# Patient Record
Sex: FEMALE | Race: WHITE | NOT HISPANIC OR LATINO | Employment: OTHER | URBAN - METROPOLITAN AREA
[De-identification: names, ages, dates, MRNs, and addresses within clinical notes are randomized per-mention and may not be internally consistent; named-entity substitution may affect disease eponyms.]

---

## 2020-01-01 ENCOUNTER — APPOINTMENT (EMERGENCY)
Dept: CT IMAGING | Facility: HOSPITAL | Age: 80
End: 2020-01-01
Payer: MEDICARE

## 2020-01-01 ENCOUNTER — PATIENT OUTREACH (OUTPATIENT)
Dept: CASE MANAGEMENT | Facility: OTHER | Age: 80
End: 2020-01-01

## 2020-01-01 ENCOUNTER — HOSPITAL ENCOUNTER (EMERGENCY)
Facility: HOSPITAL | Age: 80
Discharge: HOME | End: 2020-02-12
Attending: EMERGENCY MEDICINE | Admitting: EMERGENCY MEDICINE
Payer: MEDICARE

## 2020-01-01 ENCOUNTER — APPOINTMENT (EMERGENCY)
Dept: RADIOLOGY | Facility: HOSPITAL | Age: 80
DRG: 871 | End: 2020-01-01
Payer: MEDICARE

## 2020-01-01 ENCOUNTER — APPOINTMENT (EMERGENCY)
Dept: RADIOLOGY | Facility: HOSPITAL | Age: 80
End: 2020-01-01
Payer: MEDICARE

## 2020-01-01 ENCOUNTER — HOSPITAL ENCOUNTER (EMERGENCY)
Facility: HOSPITAL | Age: 80
Discharge: NURSING, REHAB OR LONG-TERM CARE | End: 2020-01-19
Attending: EMERGENCY MEDICINE | Admitting: EMERGENCY MEDICINE
Payer: MEDICARE

## 2020-01-01 ENCOUNTER — TELEPHONE (OUTPATIENT)
Dept: OTHER | Facility: OTHER | Age: 80
End: 2020-01-01

## 2020-01-01 ENCOUNTER — TELEPHONE (OUTPATIENT)
Dept: BEHAVIORAL/MENTAL HEALTH CLINIC | Facility: CLINIC | Age: 80
End: 2020-01-01

## 2020-01-01 ENCOUNTER — TELEMEDICINE (OUTPATIENT)
Dept: PULMONOLOGY | Facility: CLINIC | Age: 80
End: 2020-01-01
Payer: MEDICARE

## 2020-01-01 ENCOUNTER — HOSPITAL ENCOUNTER (INPATIENT)
Facility: HOSPITAL | Age: 80
LOS: 6 days | Discharge: HOME | DRG: 871 | End: 2020-05-11
Attending: EMERGENCY MEDICINE | Admitting: INTERNAL MEDICINE
Payer: MEDICARE

## 2020-01-01 ENCOUNTER — HOSPITAL ENCOUNTER (EMERGENCY)
Facility: HOSPITAL | Age: 80
Discharge: HOME | End: 2020-04-27
Attending: EMERGENCY MEDICINE | Admitting: EMERGENCY MEDICINE
Payer: MEDICARE

## 2020-01-01 ENCOUNTER — HOSPITAL ENCOUNTER (EMERGENCY)
Facility: HOSPITAL | Age: 80
Discharge: HOME | End: 2020-01-27
Attending: EMERGENCY MEDICINE
Payer: MEDICARE

## 2020-01-01 ENCOUNTER — TELEPHONE (OUTPATIENT)
Dept: PSYCHIATRY | Facility: CLINIC | Age: 80
End: 2020-01-01

## 2020-01-01 ENCOUNTER — APPOINTMENT (INPATIENT)
Dept: ULTRASOUND IMAGING | Facility: HOSPITAL | Age: 80
DRG: 871 | End: 2020-01-01
Payer: MEDICARE

## 2020-01-01 ENCOUNTER — APPOINTMENT (INPATIENT)
Dept: CT IMAGING | Facility: HOSPITAL | Age: 80
DRG: 871 | End: 2020-01-01
Payer: MEDICARE

## 2020-01-01 ENCOUNTER — TELEPHONE (OUTPATIENT)
Dept: PULMONOLOGY | Facility: CLINIC | Age: 80
End: 2020-01-01

## 2020-01-01 ENCOUNTER — APPOINTMENT (EMERGENCY)
Dept: CT IMAGING | Facility: HOSPITAL | Age: 80
DRG: 871 | End: 2020-01-01
Payer: MEDICARE

## 2020-01-01 ENCOUNTER — PATIENT OUTREACH (OUTPATIENT)
Dept: CASE MANAGEMENT | Facility: HOSPITAL | Age: 80
End: 2020-01-01

## 2020-01-01 ENCOUNTER — EPISODE CHANGES (OUTPATIENT)
Dept: CASE MANAGEMENT | Facility: OTHER | Age: 80
End: 2020-01-01

## 2020-01-01 ENCOUNTER — HOSPITAL ENCOUNTER (EMERGENCY)
Facility: HOSPITAL | Age: 80
Discharge: HOME | End: 2020-02-08
Attending: EMERGENCY MEDICINE | Admitting: EMERGENCY MEDICINE
Payer: MEDICARE

## 2020-01-01 ENCOUNTER — HOSPITAL ENCOUNTER (EMERGENCY)
Facility: HOSPITAL | Age: 80
Discharge: HOME | End: 2020-02-09
Attending: EMERGENCY MEDICINE
Payer: MEDICARE

## 2020-01-01 ENCOUNTER — APPOINTMENT (INPATIENT)
Dept: NON INVASIVE DIAGNOSTICS | Facility: HOSPITAL | Age: 80
DRG: 871 | End: 2020-01-01
Payer: MEDICARE

## 2020-01-01 ENCOUNTER — HOSPITAL ENCOUNTER (EMERGENCY)
Facility: HOSPITAL | Age: 80
End: 2020-07-07
Attending: EMERGENCY MEDICINE | Admitting: EMERGENCY MEDICINE
Payer: MEDICARE

## 2020-01-01 VITALS
HEART RATE: 64 BPM | OXYGEN SATURATION: 94 % | RESPIRATION RATE: 18 BRPM | TEMPERATURE: 98 F | WEIGHT: 190.26 LBS | SYSTOLIC BLOOD PRESSURE: 144 MMHG | DIASTOLIC BLOOD PRESSURE: 67 MMHG

## 2020-01-01 VITALS
DIASTOLIC BLOOD PRESSURE: 65 MMHG | HEART RATE: 64 BPM | HEIGHT: 65 IN | BODY MASS INDEX: 28.51 KG/M2 | TEMPERATURE: 97.7 F | OXYGEN SATURATION: 93 % | SYSTOLIC BLOOD PRESSURE: 131 MMHG | RESPIRATION RATE: 18 BRPM

## 2020-01-01 VITALS
SYSTOLIC BLOOD PRESSURE: 115 MMHG | RESPIRATION RATE: 16 BRPM | WEIGHT: 171.3 LBS | OXYGEN SATURATION: 92 % | HEART RATE: 100 BPM | TEMPERATURE: 98.3 F | DIASTOLIC BLOOD PRESSURE: 70 MMHG

## 2020-01-01 VITALS
TEMPERATURE: 97.8 F | DIASTOLIC BLOOD PRESSURE: 51 MMHG | WEIGHT: 194 LBS | SYSTOLIC BLOOD PRESSURE: 98 MMHG | OXYGEN SATURATION: 98 % | HEART RATE: 63 BPM | RESPIRATION RATE: 16 BRPM

## 2020-01-01 VITALS
HEART RATE: 93 BPM | SYSTOLIC BLOOD PRESSURE: 116 MMHG | TEMPERATURE: 97 F | DIASTOLIC BLOOD PRESSURE: 64 MMHG | OXYGEN SATURATION: 92 %

## 2020-01-01 VITALS
DIASTOLIC BLOOD PRESSURE: 84 MMHG | TEMPERATURE: 97.9 F | OXYGEN SATURATION: 96 % | SYSTOLIC BLOOD PRESSURE: 133 MMHG | RESPIRATION RATE: 18 BRPM | WEIGHT: 193.12 LBS | HEART RATE: 67 BPM

## 2020-01-01 VITALS
DIASTOLIC BLOOD PRESSURE: 35 MMHG | WEIGHT: 138.23 LBS | TEMPERATURE: 98.7 F | SYSTOLIC BLOOD PRESSURE: 67 MMHG | BODY MASS INDEX: 23 KG/M2

## 2020-01-01 VITALS
RESPIRATION RATE: 16 BRPM | SYSTOLIC BLOOD PRESSURE: 167 MMHG | DIASTOLIC BLOOD PRESSURE: 99 MMHG | HEART RATE: 64 BPM | TEMPERATURE: 98 F | OXYGEN SATURATION: 98 %

## 2020-01-01 VITALS
OXYGEN SATURATION: 95 % | DIASTOLIC BLOOD PRESSURE: 67 MMHG | SYSTOLIC BLOOD PRESSURE: 164 MMHG | WEIGHT: 197.53 LBS | HEART RATE: 62 BPM | RESPIRATION RATE: 16 BRPM | TEMPERATURE: 98.8 F
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
## 2020-01-01 DIAGNOSIS — G30.9 ALZHEIMER'S DEMENTIA WITH BEHAVIORAL DISTURBANCE, UNSPECIFIED TIMING OF DEMENTIA ONSET (HCC): Primary | ICD-10-CM
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
## 2020-01-01 DIAGNOSIS — I48.91 ATRIAL FIBRILLATION (HCC): ICD-10-CM

## 2020-01-01 DIAGNOSIS — R57.8 NONTRAUMATIC HEMORRHAGIC SHOCK (HCC): Primary | ICD-10-CM
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
## 2020-01-01 DIAGNOSIS — R10.84 ACUTE GENERALIZED ABDOMINAL PAIN: ICD-10-CM

## 2020-01-01 DIAGNOSIS — R41.82 ALTERED MENTAL STATUS, UNSPECIFIED ALTERED MENTAL STATUS TYPE: Primary | ICD-10-CM

## 2020-01-01 DIAGNOSIS — D72.829 LEUKOCYTOSIS, UNSPECIFIED TYPE: ICD-10-CM

## 2020-01-01 DIAGNOSIS — R09.02 HYPOXIA: ICD-10-CM
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
## 2020-01-01 DIAGNOSIS — M25.512 LEFT SHOULDER PAIN: Primary | ICD-10-CM

## 2020-01-01 DIAGNOSIS — S00.83XA FOREHEAD CONTUSION: Primary | ICD-10-CM

## 2020-01-01 DIAGNOSIS — I82.401 ACUTE DEEP VEIN THROMBOSIS (DVT) OF RIGHT LOWER EXTREMITY, UNSPECIFIED VEIN (HCC): ICD-10-CM

## 2020-01-01 DIAGNOSIS — E87.2 HIGH ANION GAP METABOLIC ACIDOSIS: ICD-10-CM

## 2020-01-01 DIAGNOSIS — J96.01 ACUTE RESPIRATORY FAILURE WITH HYPOXIA (HCC): ICD-10-CM

## 2020-01-01 DIAGNOSIS — M25.511 ACUTE PAIN OF RIGHT SHOULDER: ICD-10-CM

## 2020-01-01 DIAGNOSIS — E03.9 HYPOTHYROID: ICD-10-CM

## 2020-01-01 DIAGNOSIS — K92.2 GASTROINTESTINAL HEMORRHAGE, UNSPECIFIED GASTROINTESTINAL HEMORRHAGE TYPE: ICD-10-CM

## 2020-01-01 DIAGNOSIS — I46.9 CARDIOPULMONARY ARREST (HCC): ICD-10-CM

## 2020-01-01 DIAGNOSIS — N39.0 URINARY TRACT INFECTION: ICD-10-CM

## 2020-01-01 DIAGNOSIS — E87.0 ACUTE HYPERNATREMIA: ICD-10-CM

## 2020-01-01 DIAGNOSIS — F02.81 ALZHEIMER'S DEMENTIA WITH BEHAVIORAL DISTURBANCE, UNSPECIFIED TIMING OF DEMENTIA ONSET (HCC): Primary | ICD-10-CM

## 2020-01-01 DIAGNOSIS — N17.9 ACUTE RENAL FAILURE (ARF) (HCC): ICD-10-CM

## 2020-01-01 DIAGNOSIS — Z51.5 ENCOUNTER FOR HOSPICE CARE: ICD-10-CM

## 2020-01-01 DIAGNOSIS — I82.409 DVT (DEEP VENOUS THROMBOSIS) (HCC): ICD-10-CM

## 2020-01-01 DIAGNOSIS — S00.83XA CONTUSION OF FACE, INITIAL ENCOUNTER: ICD-10-CM

## 2020-01-01 DIAGNOSIS — R41.82 ALTERED MENTAL STATUS: ICD-10-CM

## 2020-01-01 DIAGNOSIS — A41.9 SEPSIS (HCC): ICD-10-CM

## 2020-01-01 DIAGNOSIS — E87.6 ACUTE HYPOKALEMIA: ICD-10-CM

## 2020-01-01 DIAGNOSIS — I95.9 HYPOTENSION, UNSPECIFIED HYPOTENSION TYPE: ICD-10-CM

## 2020-01-01 DIAGNOSIS — K55.9 MESENTERIC ISCHEMIA (HCC): ICD-10-CM

## 2020-01-01 DIAGNOSIS — M25.512 LEFT SHOULDER PAIN: ICD-10-CM

## 2020-01-01 LAB
ALBUMIN SERPL BCP-MCNC: 2.6 G/DL (ref 3.5–5)
ALBUMIN SERPL BCP-MCNC: 2.7 G/DL (ref 3.5–5)
ALBUMIN SERPL BCP-MCNC: 3.2 G/DL (ref 3.5–5)
ALBUMIN SERPL BCP-MCNC: 3.5 G/DL (ref 3.5–5)
ALP SERPL-CCNC: 111 U/L (ref 46–116)
ALP SERPL-CCNC: 78 U/L (ref 46–116)
ALP SERPL-CCNC: 87 U/L (ref 46–116)
ALP SERPL-CCNC: 88 U/L (ref 46–116)
ALT SERPL W P-5'-P-CCNC: 19 U/L (ref 12–78)
ALT SERPL W P-5'-P-CCNC: 27 U/L (ref 12–78)
ALT SERPL W P-5'-P-CCNC: 29 U/L (ref 12–78)
ALT SERPL W P-5'-P-CCNC: 32 U/L (ref 12–78)
ANION GAP SERPL CALCULATED.3IONS-SCNC: 11 MMOL/L (ref 4–13)
ANION GAP SERPL CALCULATED.3IONS-SCNC: 14 MMOL/L (ref 4–13)
ANION GAP SERPL CALCULATED.3IONS-SCNC: 15 MMOL/L (ref 4–13)
ANION GAP SERPL CALCULATED.3IONS-SCNC: 31 MMOL/L (ref 4–13)
ANION GAP SERPL CALCULATED.3IONS-SCNC: 6 MMOL/L (ref 4–13)
ANION GAP SERPL CALCULATED.3IONS-SCNC: 7 MMOL/L (ref 4–13)
ANION GAP SERPL CALCULATED.3IONS-SCNC: 8 MMOL/L (ref 4–13)
ANION GAP SERPL CALCULATED.3IONS-SCNC: 8 MMOL/L (ref 4–13)
ANION GAP SERPL CALCULATED.3IONS-SCNC: 9 MMOL/L (ref 4–13)
ANION GAP SERPL CALCULATED.3IONS-SCNC: 9 MMOL/L (ref 4–13)
APTT PPP: 30 SECONDS (ref 23–37)
AST SERPL W P-5'-P-CCNC: 18 U/L (ref 5–45)
AST SERPL W P-5'-P-CCNC: 27 U/L (ref 5–45)
AST SERPL W P-5'-P-CCNC: 31 U/L (ref 5–45)
AST SERPL W P-5'-P-CCNC: 46 U/L (ref 5–45)
ATRIAL RATE: 105 BPM
ATRIAL RATE: 126 BPM
ATRIAL RATE: 202 BPM
BACTERIA BLD CULT: ABNORMAL
BACTERIA BLD CULT: NORMAL
BACTERIA UR QL AUTO: ABNORMAL /HPF
BACTERIA UR QL AUTO: ABNORMAL /HPF
BASOPHILS # BLD AUTO: 0.01 THOUSANDS/ΜL (ref 0–0.1)
BASOPHILS # BLD AUTO: 0.03 THOUSANDS/ΜL (ref 0–0.1)
BASOPHILS # BLD AUTO: 0.04 THOUSANDS/ΜL (ref 0–0.1)
BASOPHILS # BLD AUTO: 0.05 THOUSANDS/ΜL (ref 0–0.1)
BASOPHILS NFR BLD AUTO: 0 % (ref 0–1)
BILIRUB SERPL-MCNC: 0.7 MG/DL (ref 0.2–1)
BILIRUB SERPL-MCNC: 0.93 MG/DL (ref 0.2–1)
BILIRUB SERPL-MCNC: 1.1 MG/DL (ref 0.2–1)
BILIRUB SERPL-MCNC: 1.94 MG/DL (ref 0.2–1)
BILIRUB UR QL STRIP: ABNORMAL
BILIRUB UR QL STRIP: NEGATIVE
BUN SERPL-MCNC: 12 MG/DL (ref 5–25)
BUN SERPL-MCNC: 13 MG/DL (ref 5–25)
BUN SERPL-MCNC: 14 MG/DL (ref 5–25)
BUN SERPL-MCNC: 14 MG/DL (ref 5–25)
BUN SERPL-MCNC: 15 MG/DL (ref 5–25)
BUN SERPL-MCNC: 15 MG/DL (ref 5–25)
BUN SERPL-MCNC: 17 MG/DL (ref 5–25)
BUN SERPL-MCNC: 17 MG/DL (ref 5–25)
BUN SERPL-MCNC: 21 MG/DL (ref 5–25)
BUN SERPL-MCNC: 34 MG/DL (ref 5–25)
CA-I BLD-SCNC: 1.15 MMOL/L (ref 1.12–1.32)
CA-I BLD-SCNC: 1.23 MMOL/L (ref 1.12–1.32)
CALCIUM SERPL-MCNC: 8.3 MG/DL (ref 8.3–10.1)
CALCIUM SERPL-MCNC: 8.6 MG/DL (ref 8.3–10.1)
CALCIUM SERPL-MCNC: 8.7 MG/DL (ref 8.3–10.1)
CALCIUM SERPL-MCNC: 8.8 MG/DL (ref 8.3–10.1)
CALCIUM SERPL-MCNC: 8.9 MG/DL (ref 8.3–10.1)
CALCIUM SERPL-MCNC: 9.1 MG/DL (ref 8.3–10.1)
CALCIUM SERPL-MCNC: 9.4 MG/DL (ref 8.3–10.1)
CALCIUM SERPL-MCNC: 9.4 MG/DL (ref 8.3–10.1)
CHLORIDE SERPL-SCNC: 103 MMOL/L (ref 100–108)
CHLORIDE SERPL-SCNC: 104 MMOL/L (ref 100–108)
CHLORIDE SERPL-SCNC: 104 MMOL/L (ref 100–108)
CHLORIDE SERPL-SCNC: 105 MMOL/L (ref 100–108)
CHLORIDE SERPL-SCNC: 105 MMOL/L (ref 100–108)
CHLORIDE SERPL-SCNC: 106 MMOL/L (ref 100–108)
CHLORIDE SERPL-SCNC: 107 MMOL/L (ref 100–108)
CHLORIDE SERPL-SCNC: 108 MMOL/L (ref 100–108)
CHLORIDE SERPL-SCNC: 110 MMOL/L (ref 100–108)
CHLORIDE SERPL-SCNC: 115 MMOL/L (ref 100–108)
CLARITY UR: ABNORMAL
CLARITY UR: CLEAR
CO2 SERPL-SCNC: 16 MMOL/L (ref 21–32)
CO2 SERPL-SCNC: 26 MMOL/L (ref 21–32)
CO2 SERPL-SCNC: 26 MMOL/L (ref 21–32)
CO2 SERPL-SCNC: 27 MMOL/L (ref 21–32)
CO2 SERPL-SCNC: 27 MMOL/L (ref 21–32)
CO2 SERPL-SCNC: 28 MMOL/L (ref 21–32)
CO2 SERPL-SCNC: 29 MMOL/L (ref 21–32)
CO2 SERPL-SCNC: 32 MMOL/L (ref 21–32)
COLOR UR: ABNORMAL
COLOR UR: YELLOW
CREAT SERPL-MCNC: 0.8 MG/DL (ref 0.6–1.3)
CREAT SERPL-MCNC: 0.84 MG/DL (ref 0.6–1.3)
CREAT SERPL-MCNC: 0.85 MG/DL (ref 0.6–1.3)
CREAT SERPL-MCNC: 0.91 MG/DL (ref 0.6–1.3)
CREAT SERPL-MCNC: 0.93 MG/DL (ref 0.6–1.3)
CREAT SERPL-MCNC: 0.95 MG/DL (ref 0.6–1.3)
CREAT SERPL-MCNC: 1.03 MG/DL (ref 0.6–1.3)
CREAT SERPL-MCNC: 1.05 MG/DL (ref 0.6–1.3)
CREAT SERPL-MCNC: 1.37 MG/DL (ref 0.6–1.3)
CREAT SERPL-MCNC: 2.74 MG/DL (ref 0.6–1.3)
CRP SERPL HS-MCNC: >90 MG/L
CRP SERPL QL: 181.3 MG/L
CRP SERPL QL: 233.7 MG/L
D DIMER PPP FEU-MCNC: >10 UG/ML FEU
D DIMER PPP FEU-MCNC: >10 UG/ML FEU
EOSINOPHIL # BLD AUTO: 0.01 THOUSAND/ΜL (ref 0–0.61)
EOSINOPHIL # BLD AUTO: 0.01 THOUSAND/ΜL (ref 0–0.61)
EOSINOPHIL # BLD AUTO: 0.02 THOUSAND/ΜL (ref 0–0.61)
EOSINOPHIL # BLD AUTO: 0.02 THOUSAND/ΜL (ref 0–0.61)
EOSINOPHIL # BLD AUTO: 0.04 THOUSAND/ΜL (ref 0–0.61)
EOSINOPHIL # BLD AUTO: 0.06 THOUSAND/ΜL (ref 0–0.61)
EOSINOPHIL NFR BLD AUTO: 0 % (ref 0–6)
ERYTHROCYTE [DISTWIDTH] IN BLOOD BY AUTOMATED COUNT: 14.4 % (ref 11.6–15.1)
ERYTHROCYTE [DISTWIDTH] IN BLOOD BY AUTOMATED COUNT: 14.4 % (ref 11.6–15.1)
ERYTHROCYTE [DISTWIDTH] IN BLOOD BY AUTOMATED COUNT: 14.5 % (ref 11.6–15.1)
ERYTHROCYTE [DISTWIDTH] IN BLOOD BY AUTOMATED COUNT: 14.5 % (ref 11.6–15.1)
ERYTHROCYTE [DISTWIDTH] IN BLOOD BY AUTOMATED COUNT: 14.6 % (ref 11.6–15.1)
ERYTHROCYTE [DISTWIDTH] IN BLOOD BY AUTOMATED COUNT: 14.6 % (ref 11.6–15.1)
ERYTHROCYTE [DISTWIDTH] IN BLOOD BY AUTOMATED COUNT: 14.7 % (ref 11.6–15.1)
ERYTHROCYTE [DISTWIDTH] IN BLOOD BY AUTOMATED COUNT: 17.7 % (ref 11.6–15.1)
ERYTHROCYTE [SEDIMENTATION RATE] IN BLOOD: 52 MM/HOUR (ref 0–20)
FERRITIN SERPL-MCNC: 526 NG/ML (ref 8–388)
FERRITIN SERPL-MCNC: 527 NG/ML (ref 8–388)
FERRITIN SERPL-MCNC: 538 NG/ML (ref 8–388)
FIBRINOGEN PPP-MCNC: 629 MG/DL (ref 227–495)
GFR SERPL CREATININE-BSD FRML MDRD: 16 ML/MIN/1.73SQ M
GFR SERPL CREATININE-BSD FRML MDRD: 37 ML/MIN/1.73SQ M
GFR SERPL CREATININE-BSD FRML MDRD: 51 ML/MIN/1.73SQ M
GFR SERPL CREATININE-BSD FRML MDRD: 52 ML/MIN/1.73SQ M
GFR SERPL CREATININE-BSD FRML MDRD: 57 ML/MIN/1.73SQ M
GFR SERPL CREATININE-BSD FRML MDRD: 59 ML/MIN/1.73SQ M
GFR SERPL CREATININE-BSD FRML MDRD: 60 ML/MIN/1.73SQ M
GFR SERPL CREATININE-BSD FRML MDRD: 65 ML/MIN/1.73SQ M
GFR SERPL CREATININE-BSD FRML MDRD: 66 ML/MIN/1.73SQ M
GFR SERPL CREATININE-BSD FRML MDRD: 70 ML/MIN/1.73SQ M
GLUCOSE SERPL-MCNC: 108 MG/DL (ref 65–140)
GLUCOSE SERPL-MCNC: 110 MG/DL (ref 65–140)
GLUCOSE SERPL-MCNC: 122 MG/DL (ref 65–140)
GLUCOSE SERPL-MCNC: 164 MG/DL (ref 65–140)
GLUCOSE SERPL-MCNC: 174 MG/DL (ref 65–140)
GLUCOSE SERPL-MCNC: 185 MG/DL (ref 65–140)
GLUCOSE SERPL-MCNC: 80 MG/DL (ref 65–140)
GLUCOSE SERPL-MCNC: 81 MG/DL (ref 65–140)
GLUCOSE SERPL-MCNC: 83 MG/DL (ref 65–140)
GLUCOSE SERPL-MCNC: 95 MG/DL (ref 65–140)
GLUCOSE UR STRIP-MCNC: NEGATIVE MG/DL
GLUCOSE UR STRIP-MCNC: NEGATIVE MG/DL
GRAM STN SPEC: ABNORMAL
HCT VFR BLD AUTO: 35.5 % (ref 34.8–46.1)
HCT VFR BLD AUTO: 36.4 % (ref 34.8–46.1)
HCT VFR BLD AUTO: 36.4 % (ref 34.8–46.1)
HCT VFR BLD AUTO: 39.4 % (ref 34.8–46.1)
HCT VFR BLD AUTO: 40.2 % (ref 34.8–46.1)
HCT VFR BLD AUTO: 40.2 % (ref 34.8–46.1)
HCT VFR BLD AUTO: 42.7 % (ref 34.8–46.1)
HCT VFR BLD AUTO: 43.6 % (ref 34.8–46.1)
HGB BLD-MCNC: 11.7 G/DL (ref 11.5–15.4)
HGB BLD-MCNC: 11.8 G/DL (ref 11.5–15.4)
HGB BLD-MCNC: 11.8 G/DL (ref 11.5–15.4)
HGB BLD-MCNC: 13.1 G/DL (ref 11.5–15.4)
HGB BLD-MCNC: 13.4 G/DL (ref 11.5–15.4)
HGB BLD-MCNC: 13.7 G/DL (ref 11.5–15.4)
HGB BLD-MCNC: 14.2 G/DL (ref 11.5–15.4)
HGB BLD-MCNC: 14.3 G/DL (ref 11.5–15.4)
HGB UR QL STRIP.AUTO: ABNORMAL
HGB UR QL STRIP.AUTO: ABNORMAL
HYALINE CASTS #/AREA URNS LPF: ABNORMAL /LPF
IMM GRANULOCYTES # BLD AUTO: 0.04 THOUSAND/UL (ref 0–0.2)
IMM GRANULOCYTES # BLD AUTO: 0.05 THOUSAND/UL (ref 0–0.2)
IMM GRANULOCYTES # BLD AUTO: 0.05 THOUSAND/UL (ref 0–0.2)
IMM GRANULOCYTES # BLD AUTO: 0.07 THOUSAND/UL (ref 0–0.2)
IMM GRANULOCYTES # BLD AUTO: 0.07 THOUSAND/UL (ref 0–0.2)
IMM GRANULOCYTES # BLD AUTO: 0.22 THOUSAND/UL (ref 0–0.2)
IMM GRANULOCYTES NFR BLD AUTO: 0 % (ref 0–2)
IMM GRANULOCYTES NFR BLD AUTO: 1 % (ref 0–2)
INR PPP: 1.18 (ref 0.84–1.19)
INR PPP: 1.25 (ref 0.84–1.19)
KETONES UR STRIP-MCNC: ABNORMAL MG/DL
KETONES UR STRIP-MCNC: NEGATIVE MG/DL
LACTATE SERPL-SCNC: 1.6 MMOL/L (ref 0.5–2)
LACTATE SERPL-SCNC: 3.4 MMOL/L (ref 0.5–2)
LEUKOCYTE ESTERASE UR QL STRIP: NEGATIVE
LEUKOCYTE ESTERASE UR QL STRIP: NEGATIVE
LYMPHOCYTES # BLD AUTO: 0.88 THOUSANDS/ΜL (ref 0.6–4.47)
LYMPHOCYTES # BLD AUTO: 0.95 THOUSANDS/ΜL (ref 0.6–4.47)
LYMPHOCYTES # BLD AUTO: 1.22 THOUSANDS/ΜL (ref 0.6–4.47)
LYMPHOCYTES # BLD AUTO: 1.26 THOUSANDS/ΜL (ref 0.6–4.47)
LYMPHOCYTES # BLD AUTO: 1.28 THOUSANDS/ΜL (ref 0.6–4.47)
LYMPHOCYTES # BLD AUTO: 1.68 THOUSANDS/ΜL (ref 0.6–4.47)
LYMPHOCYTES NFR BLD AUTO: 11 % (ref 14–44)
LYMPHOCYTES NFR BLD AUTO: 11 % (ref 14–44)
LYMPHOCYTES NFR BLD AUTO: 16 % (ref 14–44)
LYMPHOCYTES NFR BLD AUTO: 5 % (ref 14–44)
LYMPHOCYTES NFR BLD AUTO: 7 % (ref 14–44)
LYMPHOCYTES NFR BLD AUTO: 7 % (ref 14–44)
MAGNESIUM SERPL-MCNC: 2 MG/DL (ref 1.6–2.6)
MAGNESIUM SERPL-MCNC: 2.1 MG/DL (ref 1.6–2.6)
MAGNESIUM SERPL-MCNC: 2.2 MG/DL (ref 1.6–2.6)
MCH RBC QN AUTO: 30.6 PG (ref 26.8–34.3)
MCH RBC QN AUTO: 30.6 PG (ref 26.8–34.3)
MCH RBC QN AUTO: 30.7 PG (ref 26.8–34.3)
MCH RBC QN AUTO: 30.8 PG (ref 26.8–34.3)
MCH RBC QN AUTO: 31.2 PG (ref 26.8–34.3)
MCH RBC QN AUTO: 31.4 PG (ref 26.8–34.3)
MCH RBC QN AUTO: 31.6 PG (ref 26.8–34.3)
MCH RBC QN AUTO: 31.9 PG (ref 26.8–34.3)
MCHC RBC AUTO-ENTMCNC: 32.1 G/DL (ref 31.4–37.4)
MCHC RBC AUTO-ENTMCNC: 32.4 G/DL (ref 31.4–37.4)
MCHC RBC AUTO-ENTMCNC: 32.6 G/DL (ref 31.4–37.4)
MCHC RBC AUTO-ENTMCNC: 33.2 G/DL (ref 31.4–37.4)
MCHC RBC AUTO-ENTMCNC: 33.2 G/DL (ref 31.4–37.4)
MCHC RBC AUTO-ENTMCNC: 33.3 G/DL (ref 31.4–37.4)
MCHC RBC AUTO-ENTMCNC: 33.5 G/DL (ref 31.4–37.4)
MCHC RBC AUTO-ENTMCNC: 34.1 G/DL (ref 31.4–37.4)
MCV RBC AUTO: 92 FL (ref 82–98)
MCV RBC AUTO: 93 FL (ref 82–98)
MCV RBC AUTO: 93 FL (ref 82–98)
MCV RBC AUTO: 94 FL (ref 82–98)
MCV RBC AUTO: 94 FL (ref 82–98)
MCV RBC AUTO: 95 FL (ref 82–98)
MCV RBC AUTO: 96 FL (ref 82–98)
MCV RBC AUTO: 98 FL (ref 82–98)
MONOCYTES # BLD AUTO: 0.52 THOUSAND/ΜL (ref 0.17–1.22)
MONOCYTES # BLD AUTO: 0.62 THOUSAND/ΜL (ref 0.17–1.22)
MONOCYTES # BLD AUTO: 0.64 THOUSAND/ΜL (ref 0.17–1.22)
MONOCYTES # BLD AUTO: 0.66 THOUSAND/ΜL (ref 0.17–1.22)
MONOCYTES # BLD AUTO: 0.67 THOUSAND/ΜL (ref 0.17–1.22)
MONOCYTES # BLD AUTO: 0.8 THOUSAND/ΜL (ref 0.17–1.22)
MONOCYTES NFR BLD AUTO: 3 % (ref 4–12)
MONOCYTES NFR BLD AUTO: 4 % (ref 4–12)
MONOCYTES NFR BLD AUTO: 5 % (ref 4–12)
MONOCYTES NFR BLD AUTO: 5 % (ref 4–12)
MONOCYTES NFR BLD AUTO: 6 % (ref 4–12)
MONOCYTES NFR BLD AUTO: 6 % (ref 4–12)
MRSA NOSE QL CULT: NORMAL
MUCOUS THREADS UR QL AUTO: ABNORMAL
MUCOUS THREADS UR QL AUTO: ABNORMAL
NEUTROPHILS # BLD AUTO: 10.11 THOUSANDS/ΜL (ref 1.85–7.62)
NEUTROPHILS # BLD AUTO: 11.27 THOUSANDS/ΜL (ref 1.85–7.62)
NEUTROPHILS # BLD AUTO: 12.01 THOUSANDS/ΜL (ref 1.85–7.62)
NEUTROPHILS # BLD AUTO: 21.54 THOUSANDS/ΜL (ref 1.85–7.62)
NEUTROPHILS # BLD AUTO: 8.05 THOUSANDS/ΜL (ref 1.85–7.62)
NEUTROPHILS # BLD AUTO: 9.36 THOUSANDS/ΜL (ref 1.85–7.62)
NEUTS SEG NFR BLD AUTO: 78 % (ref 43–75)
NEUTS SEG NFR BLD AUTO: 83 % (ref 43–75)
NEUTS SEG NFR BLD AUTO: 85 % (ref 43–75)
NEUTS SEG NFR BLD AUTO: 87 % (ref 43–75)
NEUTS SEG NFR BLD AUTO: 87 % (ref 43–75)
NEUTS SEG NFR BLD AUTO: 91 % (ref 43–75)
NITRITE UR QL STRIP: NEGATIVE
NITRITE UR QL STRIP: NEGATIVE
NON-SQ EPI CELLS URNS QL MICRO: ABNORMAL /HPF
NON-SQ EPI CELLS URNS QL MICRO: ABNORMAL /HPF
NRBC BLD AUTO-RTO: 0 /100 WBCS
NT-PROBNP SERPL-MCNC: 499 PG/ML
P AXIS: 55 DEGREES
P AXIS: 66 DEGREES
PH UR STRIP.AUTO: 6 [PH] (ref 4.5–8)
PH UR STRIP.AUTO: 6.5 [PH]
PHOSPHATE SERPL-MCNC: 2 MG/DL (ref 2.3–4.1)
PHOSPHATE SERPL-MCNC: 2.9 MG/DL (ref 2.3–4.1)
PLATELET # BLD AUTO: 234 THOUSANDS/UL (ref 149–390)
PLATELET # BLD AUTO: 257 THOUSANDS/UL (ref 149–390)
PLATELET # BLD AUTO: 280 THOUSANDS/UL (ref 149–390)
PLATELET # BLD AUTO: 297 THOUSANDS/UL (ref 149–390)
PLATELET # BLD AUTO: 308 THOUSANDS/UL (ref 149–390)
PLATELET # BLD AUTO: 339 THOUSANDS/UL (ref 149–390)
PLATELET # BLD AUTO: 385 THOUSANDS/UL (ref 149–390)
PLATELET # BLD AUTO: 491 THOUSANDS/UL (ref 149–390)
PMV BLD AUTO: 10.2 FL (ref 8.9–12.7)
PMV BLD AUTO: 11.2 FL (ref 8.9–12.7)
PMV BLD AUTO: 9.6 FL (ref 8.9–12.7)
PMV BLD AUTO: 9.6 FL (ref 8.9–12.7)
PMV BLD AUTO: 9.7 FL (ref 8.9–12.7)
PMV BLD AUTO: 9.8 FL (ref 8.9–12.7)
POTASSIUM SERPL-SCNC: 2.5 MMOL/L (ref 3.5–5.3)
POTASSIUM SERPL-SCNC: 2.7 MMOL/L (ref 3.5–5.3)
POTASSIUM SERPL-SCNC: 3.3 MMOL/L (ref 3.5–5.3)
POTASSIUM SERPL-SCNC: 3.4 MMOL/L (ref 3.5–5.3)
POTASSIUM SERPL-SCNC: 3.4 MMOL/L (ref 3.5–5.3)
POTASSIUM SERPL-SCNC: 3.5 MMOL/L (ref 3.5–5.3)
POTASSIUM SERPL-SCNC: 3.6 MMOL/L (ref 3.5–5.3)
POTASSIUM SERPL-SCNC: 3.8 MMOL/L (ref 3.5–5.3)
POTASSIUM SERPL-SCNC: 3.8 MMOL/L (ref 3.5–5.3)
POTASSIUM SERPL-SCNC: 4 MMOL/L (ref 3.5–5.3)
PR INTERVAL: 184 MS
PR INTERVAL: 192 MS
PROCALCITONIN SERPL-MCNC: 0.11 NG/ML
PROCALCITONIN SERPL-MCNC: <0.05 NG/ML
PROCALCITONIN SERPL-MCNC: <0.05 NG/ML
PROT SERPL-MCNC: 6.5 G/DL (ref 6.4–8.2)
PROT SERPL-MCNC: 6.7 G/DL (ref 6.4–8.2)
PROT SERPL-MCNC: 7.1 G/DL (ref 6.4–8.2)
PROT SERPL-MCNC: 7.5 G/DL (ref 6.4–8.2)
PROT UR STRIP-MCNC: ABNORMAL MG/DL
PROT UR STRIP-MCNC: NEGATIVE MG/DL
PROTHROMBIN TIME: 14.4 SECONDS (ref 11.6–14.5)
PROTHROMBIN TIME: 15.1 SECONDS (ref 11.6–14.5)
QRS AXIS: 46 DEGREES
QRS AXIS: 67 DEGREES
QRS AXIS: 68 DEGREES
QRSD INTERVAL: 76 MS
QRSD INTERVAL: 78 MS
QRSD INTERVAL: 88 MS
QT INTERVAL: 294 MS
QT INTERVAL: 296 MS
QT INTERVAL: 304 MS
QTC INTERVAL: 401 MS
QTC INTERVAL: 428 MS
QTC INTERVAL: 465 MS
RBC # BLD AUTO: 3.81 MILLION/UL (ref 3.81–5.12)
RBC # BLD AUTO: 3.83 MILLION/UL (ref 3.81–5.12)
RBC # BLD AUTO: 3.85 MILLION/UL (ref 3.81–5.12)
RBC # BLD AUTO: 4.2 MILLION/UL (ref 3.81–5.12)
RBC # BLD AUTO: 4.33 MILLION/UL (ref 3.81–5.12)
RBC # BLD AUTO: 4.38 MILLION/UL (ref 3.81–5.12)
RBC # BLD AUTO: 4.45 MILLION/UL (ref 3.81–5.12)
RBC # BLD AUTO: 4.55 MILLION/UL (ref 3.81–5.12)
RBC #/AREA URNS AUTO: ABNORMAL /HPF
RBC #/AREA URNS AUTO: ABNORMAL /HPF
SARS-COV-2 RNA RESP QL NAA+PROBE: NEGATIVE
SODIUM SERPL-SCNC: 140 MMOL/L (ref 136–145)
SODIUM SERPL-SCNC: 142 MMOL/L (ref 136–145)
SODIUM SERPL-SCNC: 144 MMOL/L (ref 136–145)
SODIUM SERPL-SCNC: 145 MMOL/L (ref 136–145)
SODIUM SERPL-SCNC: 162 MMOL/L (ref 136–145)
SP GR UR STRIP.AUTO: <=1.005 (ref 1–1.03)
SP GR UR STRIP.AUTO: >=1.03 (ref 1–1.03)
T WAVE AXIS: -1 DEGREES
T WAVE AXIS: -19 DEGREES
T WAVE AXIS: -56 DEGREES
TROPONIN I SERPL-MCNC: 0.03 NG/ML
TSH SERPL DL<=0.05 MIU/L-ACNC: 3.37 UIU/ML (ref 0.36–3.74)
UROBILINOGEN UR QL STRIP.AUTO: 1 E.U./DL
UROBILINOGEN UR QL STRIP.AUTO: 2 E.U./DL
VENTRICULAR RATE: 105 BPM
VENTRICULAR RATE: 126 BPM
VENTRICULAR RATE: 151 BPM
WBC # BLD AUTO: 10.1 THOUSAND/UL (ref 4.31–10.16)
WBC # BLD AUTO: 10.51 THOUSAND/UL (ref 4.31–10.16)
WBC # BLD AUTO: 11.38 THOUSAND/UL (ref 4.31–10.16)
WBC # BLD AUTO: 11.96 THOUSAND/UL (ref 4.31–10.16)
WBC # BLD AUTO: 12.93 THOUSAND/UL (ref 4.31–10.16)
WBC # BLD AUTO: 13.76 THOUSAND/UL (ref 4.31–10.16)
WBC # BLD AUTO: 23.83 THOUSAND/UL (ref 4.31–10.16)
WBC # BLD AUTO: 9.42 THOUSAND/UL (ref 4.31–10.16)
WBC #/AREA URNS AUTO: ABNORMAL /HPF
WBC #/AREA URNS AUTO: ABNORMAL /HPF

## 2020-01-01 PROCEDURE — 70450 CT HEAD/BRAIN W/O DYE: CPT

## 2020-01-01 PROCEDURE — 99232 SBSQ HOSP IP/OBS MODERATE 35: CPT | Performed by: INTERNAL MEDICINE

## 2020-01-01 PROCEDURE — 99223 1ST HOSP IP/OBS HIGH 75: CPT | Performed by: INTERNAL MEDICINE

## 2020-01-01 PROCEDURE — 83735 ASSAY OF MAGNESIUM: CPT | Performed by: INTERNAL MEDICINE

## 2020-01-01 PROCEDURE — 92526 ORAL FUNCTION THERAPY: CPT

## 2020-01-01 PROCEDURE — 80048 BASIC METABOLIC PNL TOTAL CA: CPT | Performed by: INTERNAL MEDICINE

## 2020-01-01 PROCEDURE — 80053 COMPREHEN METABOLIC PANEL: CPT | Performed by: INTERNAL MEDICINE

## 2020-01-01 PROCEDURE — 93010 ELECTROCARDIOGRAM REPORT: CPT | Performed by: INTERNAL MEDICINE

## 2020-01-01 PROCEDURE — 83880 ASSAY OF NATRIURETIC PEPTIDE: CPT | Performed by: PHYSICIAN ASSISTANT

## 2020-01-01 PROCEDURE — 99285 EMERGENCY DEPT VISIT HI MDM: CPT

## 2020-01-01 PROCEDURE — 82330 ASSAY OF CALCIUM: CPT | Performed by: INTERNAL MEDICINE

## 2020-01-01 PROCEDURE — 93306 TTE W/DOPPLER COMPLETE: CPT | Performed by: INTERNAL MEDICINE

## 2020-01-01 PROCEDURE — 99232 SBSQ HOSP IP/OBS MODERATE 35: CPT | Performed by: PHYSICIAN ASSISTANT

## 2020-01-01 PROCEDURE — 99284 EMERGENCY DEPT VISIT MOD MDM: CPT

## 2020-01-01 PROCEDURE — 99214 OFFICE O/P EST MOD 30 MIN: CPT | Performed by: INTERNAL MEDICINE

## 2020-01-01 PROCEDURE — 92610 EVALUATE SWALLOWING FUNCTION: CPT

## 2020-01-01 PROCEDURE — 99283 EMERGENCY DEPT VISIT LOW MDM: CPT | Performed by: EMERGENCY MEDICINE

## 2020-01-01 PROCEDURE — 71045 X-RAY EXAM CHEST 1 VIEW: CPT

## 2020-01-01 PROCEDURE — 74176 CT ABD & PELVIS W/O CONTRAST: CPT

## 2020-01-01 PROCEDURE — 99284 EMERGENCY DEPT VISIT MOD MDM: CPT | Performed by: EMERGENCY MEDICINE

## 2020-01-01 PROCEDURE — 36415 COLL VENOUS BLD VENIPUNCTURE: CPT | Performed by: PHYSICIAN ASSISTANT

## 2020-01-01 PROCEDURE — 84145 PROCALCITONIN (PCT): CPT | Performed by: INTERNAL MEDICINE

## 2020-01-01 PROCEDURE — 96376 TX/PRO/DX INJ SAME DRUG ADON: CPT

## 2020-01-01 PROCEDURE — 80048 BASIC METABOLIC PNL TOTAL CA: CPT | Performed by: EMERGENCY MEDICINE

## 2020-01-01 PROCEDURE — 86140 C-REACTIVE PROTEIN: CPT | Performed by: INTERNAL MEDICINE

## 2020-01-01 PROCEDURE — 94762 N-INVAS EAR/PLS OXIMTRY CONT: CPT

## 2020-01-01 PROCEDURE — 87635 SARS-COV-2 COVID-19 AMP PRB: CPT | Performed by: PHYSICIAN ASSISTANT

## 2020-01-01 PROCEDURE — 94760 N-INVAS EAR/PLS OXIMETRY 1: CPT

## 2020-01-01 PROCEDURE — 93005 ELECTROCARDIOGRAM TRACING: CPT

## 2020-01-01 PROCEDURE — 85025 COMPLETE CBC W/AUTO DIFF WBC: CPT | Performed by: INTERNAL MEDICINE

## 2020-01-01 PROCEDURE — 99285 EMERGENCY DEPT VISIT HI MDM: CPT | Performed by: PHYSICIAN ASSISTANT

## 2020-01-01 PROCEDURE — 84443 ASSAY THYROID STIM HORMONE: CPT | Performed by: INTERNAL MEDICINE

## 2020-01-01 PROCEDURE — 93970 EXTREMITY STUDY: CPT

## 2020-01-01 PROCEDURE — 83605 ASSAY OF LACTIC ACID: CPT | Performed by: PHYSICIAN ASSISTANT

## 2020-01-01 PROCEDURE — 81001 URINALYSIS AUTO W/SCOPE: CPT | Performed by: EMERGENCY MEDICINE

## 2020-01-01 PROCEDURE — 85027 COMPLETE CBC AUTOMATED: CPT | Performed by: INTERNAL MEDICINE

## 2020-01-01 PROCEDURE — 87081 CULTURE SCREEN ONLY: CPT | Performed by: INTERNAL MEDICINE

## 2020-01-01 PROCEDURE — 80053 COMPREHEN METABOLIC PANEL: CPT | Performed by: PHYSICIAN ASSISTANT

## 2020-01-01 PROCEDURE — 73030 X-RAY EXAM OF SHOULDER: CPT

## 2020-01-01 PROCEDURE — 99232 SBSQ HOSP IP/OBS MODERATE 35: CPT | Performed by: NURSE PRACTITIONER

## 2020-01-01 PROCEDURE — 82728 ASSAY OF FERRITIN: CPT | Performed by: INTERNAL MEDICINE

## 2020-01-01 PROCEDURE — 96374 THER/PROPH/DIAG INJ IV PUSH: CPT

## 2020-01-01 PROCEDURE — 87040 BLOOD CULTURE FOR BACTERIA: CPT | Performed by: PHYSICIAN ASSISTANT

## 2020-01-01 PROCEDURE — 84145 PROCALCITONIN (PCT): CPT | Performed by: PHYSICIAN ASSISTANT

## 2020-01-01 PROCEDURE — 85379 FIBRIN DEGRADATION QUANT: CPT | Performed by: INTERNAL MEDICINE

## 2020-01-01 PROCEDURE — 93970 EXTREMITY STUDY: CPT | Performed by: SURGERY

## 2020-01-01 PROCEDURE — 85610 PROTHROMBIN TIME: CPT | Performed by: INTERNAL MEDICINE

## 2020-01-01 PROCEDURE — 99222 1ST HOSP IP/OBS MODERATE 55: CPT | Performed by: INTERNAL MEDICINE

## 2020-01-01 PROCEDURE — 36415 COLL VENOUS BLD VENIPUNCTURE: CPT | Performed by: EMERGENCY MEDICINE

## 2020-01-01 PROCEDURE — 82728 ASSAY OF FERRITIN: CPT | Performed by: PHYSICIAN ASSISTANT

## 2020-01-01 PROCEDURE — 85025 COMPLETE CBC W/AUTO DIFF WBC: CPT | Performed by: EMERGENCY MEDICINE

## 2020-01-01 PROCEDURE — 93306 TTE W/DOPPLER COMPLETE: CPT

## 2020-01-01 PROCEDURE — 84100 ASSAY OF PHOSPHORUS: CPT | Performed by: INTERNAL MEDICINE

## 2020-01-01 PROCEDURE — 85652 RBC SED RATE AUTOMATED: CPT | Performed by: PHYSICIAN ASSISTANT

## 2020-01-01 PROCEDURE — 72125 CT NECK SPINE W/O DYE: CPT

## 2020-01-01 PROCEDURE — 99283 EMERGENCY DEPT VISIT LOW MDM: CPT

## 2020-01-01 PROCEDURE — 81001 URINALYSIS AUTO W/SCOPE: CPT

## 2020-01-01 PROCEDURE — 99239 HOSP IP/OBS DSCHRG MGMT >30: CPT | Performed by: INTERNAL MEDICINE

## 2020-01-01 PROCEDURE — 84484 ASSAY OF TROPONIN QUANT: CPT | Performed by: PHYSICIAN ASSISTANT

## 2020-01-01 PROCEDURE — 85384 FIBRINOGEN ACTIVITY: CPT | Performed by: PHYSICIAN ASSISTANT

## 2020-01-01 PROCEDURE — 96360 HYDRATION IV INFUSION INIT: CPT

## 2020-01-01 PROCEDURE — 85610 PROTHROMBIN TIME: CPT | Performed by: PHYSICIAN ASSISTANT

## 2020-01-01 PROCEDURE — 99233 SBSQ HOSP IP/OBS HIGH 50: CPT | Performed by: INTERNAL MEDICINE

## 2020-01-01 PROCEDURE — 85025 COMPLETE CBC W/AUTO DIFF WBC: CPT | Performed by: PHYSICIAN ASSISTANT

## 2020-01-01 PROCEDURE — 99282 EMERGENCY DEPT VISIT SF MDM: CPT | Performed by: EMERGENCY MEDICINE

## 2020-01-01 PROCEDURE — 86141 C-REACTIVE PROTEIN HS: CPT | Performed by: PHYSICIAN ASSISTANT

## 2020-01-01 PROCEDURE — 80048 BASIC METABOLIC PNL TOTAL CA: CPT | Performed by: NURSE PRACTITIONER

## 2020-01-01 PROCEDURE — 87635 SARS-COV-2 COVID-19 AMP PRB: CPT | Performed by: EMERGENCY MEDICINE

## 2020-01-01 PROCEDURE — 80053 COMPREHEN METABOLIC PANEL: CPT | Performed by: EMERGENCY MEDICINE

## 2020-01-01 PROCEDURE — 85730 THROMBOPLASTIN TIME PARTIAL: CPT | Performed by: PHYSICIAN ASSISTANT

## 2020-01-01 RX ORDER — LANOLIN ALCOHOL/MO/W.PET/CERES
2000 CREAM (GRAM) TOPICAL DAILY
COMMUNITY

## 2020-01-01 RX ORDER — QUETIAPINE FUMARATE 25 MG/1
50 TABLET, FILM COATED ORAL 2 TIMES DAILY
Status: DISCONTINUED | OUTPATIENT
Start: 2020-01-01 | End: 2020-01-01 | Stop reason: HOSPADM

## 2020-01-01 RX ORDER — MEMANTINE HYDROCHLORIDE 10 MG/1
10 TABLET ORAL DAILY
COMMUNITY

## 2020-01-01 RX ORDER — QUETIAPINE FUMARATE 50 MG/1
50 TABLET, FILM COATED ORAL 2 TIMES DAILY
COMMUNITY

## 2020-01-01 RX ORDER — LEVOTHYROXINE SODIUM 0.05 MG/1
50 TABLET ORAL EVERY OTHER DAY
COMMUNITY

## 2020-01-01 RX ORDER — MORPHINE SULFATE 100 MG/5ML
10 SOLUTION ORAL
Qty: 30 ML | Refills: 0 | Status: SHIPPED | OUTPATIENT
Start: 2020-01-01 | End: 2020-07-17

## 2020-01-01 RX ORDER — SODIUM CHLORIDE, SODIUM LACTATE, POTASSIUM CHLORIDE, CALCIUM CHLORIDE 600; 310; 30; 20 MG/100ML; MG/100ML; MG/100ML; MG/100ML
500 INJECTION, SOLUTION INTRAVENOUS CONTINUOUS
Status: DISCONTINUED | OUTPATIENT
Start: 2020-01-01 | End: 2020-01-01

## 2020-01-01 RX ORDER — NALOXONE HYDROCHLORIDE 1 MG/ML
1 INJECTION PARENTERAL ONCE
Status: COMPLETED | OUTPATIENT
Start: 2020-01-01 | End: 2020-01-01

## 2020-01-01 RX ORDER — MELATONIN
2000 DAILY
Qty: 60 TABLET | Refills: 0 | Status: SHIPPED | OUTPATIENT
Start: 2020-01-01 | End: 2020-01-01

## 2020-01-01 RX ORDER — ATORVASTATIN CALCIUM 40 MG/1
40 TABLET, FILM COATED ORAL
Status: DISCONTINUED | OUTPATIENT
Start: 2020-01-01 | End: 2020-01-01

## 2020-01-01 RX ORDER — POTASSIUM CHLORIDE 29.8 MG/ML
40 INJECTION INTRAVENOUS ONCE
Status: DISCONTINUED | OUTPATIENT
Start: 2020-01-01 | End: 2020-01-01

## 2020-01-01 RX ORDER — METHYLPREDNISOLONE SODIUM SUCCINATE 40 MG/ML
40 INJECTION, POWDER, LYOPHILIZED, FOR SOLUTION INTRAMUSCULAR; INTRAVENOUS DAILY
Status: DISCONTINUED | OUTPATIENT
Start: 2020-01-01 | End: 2020-01-01

## 2020-01-01 RX ORDER — SENNOSIDES 8.6 MG
650 CAPSULE ORAL EVERY 6 HOURS PRN
COMMUNITY

## 2020-01-01 RX ORDER — LEVOTHYROXINE SODIUM 0.07 MG/1
75 TABLET ORAL EVERY OTHER DAY
COMMUNITY

## 2020-01-01 RX ORDER — ACETAMINOPHEN 325 MG/1
650 TABLET ORAL EVERY 6 HOURS PRN
Status: DISCONTINUED | OUTPATIENT
Start: 2020-01-01 | End: 2020-01-01 | Stop reason: HOSPADM

## 2020-01-01 RX ORDER — DONEPEZIL HYDROCHLORIDE 10 MG/1
10 TABLET, FILM COATED ORAL
COMMUNITY

## 2020-01-01 RX ORDER — MORPHINE SULFATE 10 MG/ML
6 INJECTION, SOLUTION INTRAMUSCULAR; INTRAVENOUS ONCE
Status: COMPLETED | OUTPATIENT
Start: 2020-01-01 | End: 2020-01-01

## 2020-01-01 RX ORDER — FUROSEMIDE 40 MG/1
40 TABLET ORAL 2 TIMES DAILY
COMMUNITY
End: 2020-01-01 | Stop reason: HOSPADM

## 2020-01-01 RX ORDER — POTASSIUM CHLORIDE 20 MEQ/1
40 TABLET, EXTENDED RELEASE ORAL ONCE
Status: DISCONTINUED | OUTPATIENT
Start: 2020-01-01 | End: 2020-01-01

## 2020-01-01 RX ORDER — HYDROXYCHLOROQUINE SULFATE 200 MG/1
800 TABLET, FILM COATED ORAL EVERY 24 HOURS
Status: DISPENSED | OUTPATIENT
Start: 2020-01-01 | End: 2020-01-01

## 2020-01-01 RX ORDER — METOPROLOL TARTRATE 5 MG/5ML
5 INJECTION INTRAVENOUS ONCE
Status: COMPLETED | OUTPATIENT
Start: 2020-01-01 | End: 2020-01-01

## 2020-01-01 RX ORDER — HYDROXYCHLOROQUINE SULFATE 200 MG/1
200 TABLET, FILM COATED ORAL EVERY 12 HOURS
Status: DISCONTINUED | OUTPATIENT
Start: 2020-01-01 | End: 2020-01-01

## 2020-01-01 RX ORDER — LIDOCAINE 50 MG/G
1 PATCH TOPICAL ONCE
Status: DISCONTINUED | OUTPATIENT
Start: 2020-01-01 | End: 2020-01-01 | Stop reason: HOSPADM

## 2020-01-01 RX ORDER — LEVOTHYROXINE SODIUM 0.07 MG/1
75 TABLET ORAL
Status: DISCONTINUED | OUTPATIENT
Start: 2020-01-01 | End: 2020-01-01 | Stop reason: HOSPADM

## 2020-01-01 RX ORDER — POTASSIUM CHLORIDE 14.9 MG/ML
20 INJECTION INTRAVENOUS
Status: COMPLETED | OUTPATIENT
Start: 2020-01-01 | End: 2020-01-01

## 2020-01-01 RX ORDER — MORPHINE SULFATE 4 MG/ML
4 INJECTION, SOLUTION INTRAMUSCULAR; INTRAVENOUS ONCE
Status: COMPLETED | OUTPATIENT
Start: 2020-01-01 | End: 2020-01-01

## 2020-01-01 RX ORDER — DOXYCYCLINE HYCLATE 100 MG/1
100 CAPSULE ORAL EVERY 12 HOURS
Status: DISCONTINUED | OUTPATIENT
Start: 2020-01-01 | End: 2020-01-01

## 2020-01-01 RX ORDER — LEVOTHYROXINE SODIUM 0.05 MG/1
50 TABLET ORAL
Status: DISCONTINUED | OUTPATIENT
Start: 2020-01-01 | End: 2020-01-01 | Stop reason: HOSPADM

## 2020-01-01 RX ORDER — POTASSIUM CHLORIDE 20 MEQ/1
20 TABLET, EXTENDED RELEASE ORAL ONCE
Status: COMPLETED | OUTPATIENT
Start: 2020-01-01 | End: 2020-01-01

## 2020-01-01 RX ORDER — PAROXETINE 10 MG/1
30 TABLET, FILM COATED ORAL DAILY
COMMUNITY

## 2020-01-01 RX ORDER — SODIUM CHLORIDE 9 MG/ML
75 INJECTION, SOLUTION INTRAVENOUS CONTINUOUS
Status: DISPENSED | OUTPATIENT
Start: 2020-01-01 | End: 2020-01-01

## 2020-01-01 RX ORDER — LIDOCAINE HCL 20 MG/ML
1 SYRINGE (ML) INTRAVENOUS ONCE
Status: COMPLETED | OUTPATIENT
Start: 2020-01-01 | End: 2020-01-01

## 2020-01-01 RX ORDER — ACETAMINOPHEN 325 MG/1
650 TABLET ORAL ONCE
Status: DISCONTINUED | OUTPATIENT
Start: 2020-01-01 | End: 2020-01-01 | Stop reason: HOSPADM

## 2020-01-01 RX ORDER — LIDOCAINE HCL 20 MG/ML
1 SYRINGE (ML) INTRAVENOUS ONCE
Status: DISCONTINUED | OUTPATIENT
Start: 2020-01-01 | End: 2020-01-01

## 2020-01-01 RX ORDER — POTASSIUM CHLORIDE 20 MEQ/1
40 TABLET, EXTENDED RELEASE ORAL ONCE
Status: COMPLETED | OUTPATIENT
Start: 2020-01-01 | End: 2020-01-01

## 2020-01-01 RX ORDER — DONEPEZIL HYDROCHLORIDE 5 MG/1
10 TABLET, FILM COATED ORAL
Status: DISCONTINUED | OUTPATIENT
Start: 2020-01-01 | End: 2020-01-01 | Stop reason: HOSPADM

## 2020-01-01 RX ORDER — QUETIAPINE FUMARATE 25 MG/1
50 TABLET, FILM COATED ORAL ONCE
Status: COMPLETED | OUTPATIENT
Start: 2020-01-01 | End: 2020-01-01

## 2020-01-01 RX ORDER — PAROXETINE HYDROCHLORIDE 20 MG/1
30 TABLET, FILM COATED ORAL DAILY
Status: DISCONTINUED | OUTPATIENT
Start: 2020-01-01 | End: 2020-01-01 | Stop reason: HOSPADM

## 2020-01-01 RX ORDER — ASCORBIC ACID 500 MG
1000 TABLET ORAL EVERY 12 HOURS SCHEDULED
Status: DISCONTINUED | OUTPATIENT
Start: 2020-01-01 | End: 2020-01-01

## 2020-01-01 RX ORDER — CAPSAICIN 0.07 G/100G
CREAM TOPICAL 3 TIMES DAILY
Qty: 28.3 G | Refills: 0 | Status: SHIPPED | OUTPATIENT
Start: 2020-01-01 | End: 2020-01-01 | Stop reason: HOSPADM

## 2020-01-01 RX ORDER — MELATONIN
2000 DAILY
Status: DISCONTINUED | OUTPATIENT
Start: 2020-01-01 | End: 2020-01-01 | Stop reason: HOSPADM

## 2020-01-01 RX ORDER — LORAZEPAM 2 MG/ML
1 CONCENTRATE ORAL EVERY 6 HOURS PRN
Qty: 30 ML | Refills: 0 | Status: SHIPPED | OUTPATIENT
Start: 2020-01-01 | End: 2020-07-17

## 2020-01-01 RX ORDER — ZINC SULFATE 50(220)MG
220 CAPSULE ORAL DAILY
Status: DISCONTINUED | OUTPATIENT
Start: 2020-01-01 | End: 2020-01-01

## 2020-01-01 RX ORDER — MEMANTINE HYDROCHLORIDE 10 MG/1
10 TABLET ORAL DAILY
Status: DISCONTINUED | OUTPATIENT
Start: 2020-01-01 | End: 2020-01-01 | Stop reason: HOSPADM

## 2020-01-01 RX ADMIN — METRONIDAZOLE 500 MG: 500 INJECTION, SOLUTION INTRAVENOUS at 03:06

## 2020-01-01 RX ADMIN — DONEPEZIL HYDROCHLORIDE 10 MG: 5 TABLET, FILM COATED ORAL at 21:02

## 2020-01-01 RX ADMIN — METRONIDAZOLE 500 MG: 500 INJECTION, SOLUTION INTRAVENOUS at 19:52

## 2020-01-01 RX ADMIN — CEFTRIAXONE SODIUM 1000 MG: 10 INJECTION, POWDER, FOR SOLUTION INTRAVENOUS at 18:08

## 2020-01-01 RX ADMIN — CEFTRIAXONE SODIUM 1000 MG: 10 INJECTION, POWDER, FOR SOLUTION INTRAVENOUS at 15:05

## 2020-01-01 RX ADMIN — METRONIDAZOLE 500 MG: 500 INJECTION, SOLUTION INTRAVENOUS at 12:28

## 2020-01-01 RX ADMIN — PAROXETINE 30 MG: 20 TABLET, FILM COATED ORAL at 08:43

## 2020-01-01 RX ADMIN — PAROXETINE 30 MG: 20 TABLET, FILM COATED ORAL at 08:47

## 2020-01-01 RX ADMIN — METRONIDAZOLE 500 MG: 500 INJECTION, SOLUTION INTRAVENOUS at 02:44

## 2020-01-01 RX ADMIN — CYANOCOBALAMIN TAB 500 MCG 2000 MCG: 500 TAB at 08:46

## 2020-01-01 RX ADMIN — CEFTRIAXONE SODIUM 1000 MG: 10 INJECTION, POWDER, FOR SOLUTION INTRAVENOUS at 14:36

## 2020-01-01 RX ADMIN — MEMANTINE 10 MG: 10 TABLET ORAL at 08:59

## 2020-01-01 RX ADMIN — MEMANTINE 10 MG: 10 TABLET ORAL at 08:44

## 2020-01-01 RX ADMIN — METOPROLOL TARTRATE 12.5 MG: 25 TABLET ORAL at 12:39

## 2020-01-01 RX ADMIN — CEFTRIAXONE SODIUM 1000 MG: 10 INJECTION, POWDER, FOR SOLUTION INTRAVENOUS at 17:54

## 2020-01-01 RX ADMIN — QUETIAPINE FUMARATE 50 MG: 25 TABLET ORAL at 17:24

## 2020-01-01 RX ADMIN — METRONIDAZOLE 500 MG: 500 INJECTION, SOLUTION INTRAVENOUS at 11:01

## 2020-01-01 RX ADMIN — QUETIAPINE FUMARATE 50 MG: 25 TABLET ORAL at 08:48

## 2020-01-01 RX ADMIN — APIXABAN 10 MG: 5 TABLET, FILM COATED ORAL at 17:35

## 2020-01-01 RX ADMIN — METHYLPREDNISOLONE SODIUM SUCCINATE 40 MG: 40 INJECTION, POWDER, FOR SOLUTION INTRAMUSCULAR; INTRAVENOUS at 09:36

## 2020-01-01 RX ADMIN — METRONIDAZOLE 500 MG: 500 INJECTION, SOLUTION INTRAVENOUS at 03:42

## 2020-01-01 RX ADMIN — METRONIDAZOLE 500 MG: 500 INJECTION, SOLUTION INTRAVENOUS at 11:33

## 2020-01-01 RX ADMIN — SODIUM CHLORIDE 1000 ML: 0.9 INJECTION, SOLUTION INTRAVENOUS at 15:05

## 2020-01-01 RX ADMIN — METRONIDAZOLE 500 MG: 500 INJECTION, SOLUTION INTRAVENOUS at 20:55

## 2020-01-01 RX ADMIN — QUETIAPINE FUMARATE 50 MG: 25 TABLET ORAL at 17:36

## 2020-01-01 RX ADMIN — POTASSIUM CHLORIDE 20 MEQ: 14.9 INJECTION, SOLUTION INTRAVENOUS at 17:32

## 2020-01-01 RX ADMIN — APIXABAN 10 MG: 5 TABLET, FILM COATED ORAL at 08:59

## 2020-01-01 RX ADMIN — POTASSIUM CHLORIDE 20 MEQ: 14.9 INJECTION, SOLUTION INTRAVENOUS at 11:50

## 2020-01-01 RX ADMIN — QUETIAPINE FUMARATE 50 MG: 25 TABLET ORAL at 20:03

## 2020-01-01 RX ADMIN — LEVOTHYROXINE SODIUM 50 MCG: 50 TABLET ORAL at 05:15

## 2020-01-01 RX ADMIN — METRONIDAZOLE 500 MG: 500 INJECTION, SOLUTION INTRAVENOUS at 20:07

## 2020-01-01 RX ADMIN — APIXABAN 10 MG: 5 TABLET, FILM COATED ORAL at 17:24

## 2020-01-01 RX ADMIN — QUETIAPINE FUMARATE 50 MG: 25 TABLET ORAL at 17:20

## 2020-01-01 RX ADMIN — SODIUM CHLORIDE 75 ML/HR: 0.9 INJECTION, SOLUTION INTRAVENOUS at 20:02

## 2020-01-01 RX ADMIN — METOPROLOL TARTRATE 5 MG: 1 INJECTION, SOLUTION INTRAVENOUS at 12:37

## 2020-01-01 RX ADMIN — APIXABAN 10 MG: 5 TABLET, FILM COATED ORAL at 08:48

## 2020-01-01 RX ADMIN — SODIUM CHLORIDE 1000 ML: 0.9 INJECTION, SOLUTION INTRAVENOUS at 13:48

## 2020-01-01 RX ADMIN — QUETIAPINE FUMARATE 50 MG: 25 TABLET ORAL at 08:43

## 2020-01-01 RX ADMIN — POTASSIUM CHLORIDE 20 MEQ: 14.9 INJECTION, SOLUTION INTRAVENOUS at 14:17

## 2020-01-01 RX ADMIN — METHYLPREDNISOLONE SODIUM SUCCINATE 40 MG: 40 INJECTION, POWDER, FOR SOLUTION INTRAMUSCULAR; INTRAVENOUS at 11:04

## 2020-01-01 RX ADMIN — LEVOTHYROXINE SODIUM 75 MCG: 75 TABLET ORAL at 05:31

## 2020-01-01 RX ADMIN — CYANOCOBALAMIN TAB 500 MCG 2000 MCG: 500 TAB at 08:44

## 2020-01-01 RX ADMIN — MORPHINE SULFATE 6 MG: 10 INJECTION INTRAVENOUS at 15:54

## 2020-01-01 RX ADMIN — MELATONIN 2000 UNITS: at 08:43

## 2020-01-01 RX ADMIN — CYANOCOBALAMIN TAB 500 MCG 2000 MCG: 500 TAB at 08:59

## 2020-01-01 RX ADMIN — SODIUM CHLORIDE, SODIUM LACTATE, POTASSIUM CHLORIDE, AND CALCIUM CHLORIDE 500 ML: .6; .31; .03; .02 INJECTION, SOLUTION INTRAVENOUS at 13:20

## 2020-01-01 RX ADMIN — QUETIAPINE FUMARATE 50 MG: 25 TABLET ORAL at 08:59

## 2020-01-01 RX ADMIN — POTASSIUM CHLORIDE 40 MEQ: 1500 TABLET, EXTENDED RELEASE ORAL at 08:44

## 2020-01-01 RX ADMIN — APIXABAN 10 MG: 5 TABLET, FILM COATED ORAL at 08:43

## 2020-01-01 RX ADMIN — MORPHINE SULFATE 4 MG: 4 INJECTION INTRAVENOUS at 17:59

## 2020-01-01 RX ADMIN — METRONIDAZOLE 500 MG: 500 INJECTION, SOLUTION INTRAVENOUS at 10:47

## 2020-01-01 RX ADMIN — CEFTRIAXONE SODIUM 1000 MG: 10 INJECTION, POWDER, FOR SOLUTION INTRAVENOUS at 14:34

## 2020-01-01 RX ADMIN — MELATONIN 2000 UNITS: at 08:47

## 2020-01-01 RX ADMIN — ENOXAPARIN SODIUM 80 MG: 80 INJECTION SUBCUTANEOUS at 14:17

## 2020-01-01 RX ADMIN — PAROXETINE 30 MG: 20 TABLET, FILM COATED ORAL at 08:59

## 2020-01-01 RX ADMIN — MEMANTINE 10 MG: 10 TABLET ORAL at 08:48

## 2020-01-01 RX ADMIN — METRONIDAZOLE 500 MG: 500 INJECTION, SOLUTION INTRAVENOUS at 11:25

## 2020-01-01 RX ADMIN — ENOXAPARIN SODIUM 80 MG: 80 INJECTION SUBCUTANEOUS at 21:43

## 2020-01-01 RX ADMIN — METRONIDAZOLE 500 MG: 500 INJECTION, SOLUTION INTRAVENOUS at 18:35

## 2020-01-01 RX ADMIN — METRONIDAZOLE 500 MG: 500 INJECTION, SOLUTION INTRAVENOUS at 21:01

## 2020-01-01 RX ADMIN — ENOXAPARIN SODIUM 80 MG: 80 INJECTION SUBCUTANEOUS at 20:08

## 2020-01-01 RX ADMIN — POTASSIUM CHLORIDE 20 MEQ: 14.9 INJECTION, SOLUTION INTRAVENOUS at 15:18

## 2020-01-01 RX ADMIN — MELATONIN 2000 UNITS: at 08:59

## 2020-01-01 RX ADMIN — POTASSIUM CHLORIDE 20 MEQ: 1500 TABLET, EXTENDED RELEASE ORAL at 14:21

## 2020-01-01 RX ADMIN — ENOXAPARIN SODIUM 80 MG: 80 INJECTION SUBCUTANEOUS at 08:12

## 2020-01-01 RX ADMIN — ENOXAPARIN SODIUM 40 MG: 40 INJECTION SUBCUTANEOUS at 11:03

## 2020-01-01 RX ADMIN — METRONIDAZOLE 500 MG: 500 INJECTION, SOLUTION INTRAVENOUS at 05:03

## 2020-01-01 RX ADMIN — CEFTRIAXONE SODIUM 1000 MG: 10 INJECTION, POWDER, FOR SOLUTION INTRAVENOUS at 14:21

## 2020-01-01 RX ADMIN — METRONIDAZOLE 500 MG: 500 INJECTION, SOLUTION INTRAVENOUS at 04:02

## 2020-01-19 NOTE — ED PROVIDER NOTES
History  Chief Complaint   Patient presents with    Fall     per ems"mario had an unwitnessed fall with complaints of neck   head and right shoulder pain, pt not on thinners, deines LOCN/V when she fell "     HPI     79 y/o female with history of alzheimer's dementia presenting from [address] [place] [address] after an unwitnessed fall  Per Julián Lee, a nurse that I spoke with at the patient's facility, they heard shouting from the pt's room this morning  When they went in, the patient was lying on the floor on her left side  She had apparently fallen  Fall was unwitnessed  No known loss of consciousness and they ran immediately when they heard yelling from her room  This patient denies any areas of pain other than a mild headache, however does report pain when moving her right shoulder or with palpating over her neck  Per her nursing facility, she does have history a of a broken right arm for which she has hardware in her right shoulder and often complains of pain in that area  They deny any vomiting  Patient does walk at baseline without assistance, and is oriented to person and only somewhat to situation at baseline  She also has a history of an unwitnessed fall 1 week ago, and has had a black eye that is improving on the left since that time, she was not sent to the emergency department after that fall, but did have an x-ray performed of her right shoulder that showed degenerative and postoperative changes but no acute findings per the nurse at her facility  She is not on blood thinners or antiplatelet agents  Prior to Admission Medications   Prescriptions Last Dose Informant Patient Reported? Taking?    PARoxetine (PAXIL) 10 mg tablet   Yes Yes   Sig: Take 10 mg by mouth daily   QUEtiapine (SEROquel) 50 mg tablet   Yes Yes   Sig: Take 50 mg by mouth 2 (two) times a day   acetaminophen (TYLENOL) 650 mg CR tablet   Yes Yes   Sig: Take 650 mg by mouth every 6 (six) hours as needed for mild pain donepezil (ARICEPT) 10 mg tablet   Yes Yes   Sig: Take 10 mg by mouth daily at bedtime   furosemide (LASIX) 40 mg tablet   Yes Yes   Sig: Take 40 mg by mouth 2 (two) times a day Takes 40mg PO QAM and 20mg after dinner   levothyroxine 50 mcg tablet   Yes Yes   Sig: Take 50 mcg by mouth every other day Tues-Thurs-Sat   levothyroxine 75 mcg tablet   Yes Yes   Sig: Take 75 mcg by mouth every other day Sun-Mon-Wed-Fri   memantine (NAMENDA) 10 mg tablet   Yes Yes   Sig: Take 10 mg by mouth daily   vitamin B-12 (VITAMIN B-12) 1,000 mcg tablet   Yes Yes   Sig: Take 2,000 mcg by mouth daily      Facility-Administered Medications: None       History reviewed  No pertinent past medical history  History reviewed  No pertinent surgical history  History reviewed  No pertinent family history  I have reviewed and agree with the history as documented  Social History     Tobacco Use    Smoking status: Never Smoker    Smokeless tobacco: Never Used   Substance Use Topics    Alcohol use: Not on file    Drug use: Never      ROS limited by dementia  Review of Systems   Constitutional: Negative for chills and fever  HENT: Negative for congestion and facial swelling  Eyes: Negative for visual disturbance  Respiratory: Negative for cough and shortness of breath  Cardiovascular: Negative for chest pain and leg swelling  Gastrointestinal: Negative for abdominal pain, nausea and vomiting  Genitourinary: Negative for flank pain  Musculoskeletal: Positive for arthralgias (R shoulder) and neck pain  Negative for back pain, joint swelling and neck stiffness  Skin: Negative for rash  Neurological: Positive for headaches  Negative for weakness and numbness  Psychiatric/Behavioral: Positive for confusion (at baseline per nursing facility)  Negative for agitation and behavioral problems  Physical Exam  Physical Exam   Constitutional: She appears well-developed and well-nourished  No distress     HENT:   Head: Normocephalic and atraumatic  Right Ear: External ear normal    Left Ear: External ear normal    Nose: Nose normal    Mouth/Throat: Oropharynx is clear and moist    No facial swelling, old yellow ecchymosis to the L periorbital region  Eyes: Pupils are equal, round, and reactive to light  Conjunctivae and EOM are normal    Neck: Normal range of motion  Neck supple  No midline TTP over the cervical spine, TTP to the R of the c-spine   Cardiovascular: Normal rate, regular rhythm and normal heart sounds  Exam reveals no gallop and no friction rub  No murmur heard  Pulmonary/Chest: Effort normal and breath sounds normal  No respiratory distress  She has no wheezes  She has no rales  She exhibits no tenderness  Abdominal: Soft  Bowel sounds are normal  She exhibits no distension  There is no tenderness  There is no guarding  Musculoskeletal: Normal range of motion  She exhibits edema (2+ pitting edema to the bilateral LEs below the knees)  She exhibits no deformity  Extremities atraumatic  Full ROM of the joints of the bilateral LEs and L arm without pain  ROM of the R shoulder limited by pain, with overlying well-healed surgical scar  No bony TTP to the R shoulder or associated swelling  No bony TTP over the T or L spine  Neurological: She is alert  She exhibits normal muscle tone  Oriented only to person and somewhat to situation, at her baseline per nurse at her facility  5/5 strength in the upper and lower extremities with intact sensation to light touch  Face symmetric, tongue midline  CN II-XII intact  Skin: Skin is warm and dry  She is not diaphoretic         Vital Signs  ED Triage Vitals   Temperature Pulse Respirations Blood Pressure SpO2   01/19/20 0800 01/19/20 0800 01/19/20 0800 01/19/20 0800 01/19/20 0800   97 9 °F (36 6 °C) 58 18 123/60 98 %      Temp Source Heart Rate Source Patient Position - Orthostatic VS BP Location FiO2 (%)   01/19/20 0800 01/19/20 0800 01/19/20 0800 01/19/20 0800 --   Axillary Monitor Lying Left arm       Pain Score       01/19/20 0803       5           Vitals:    01/19/20 0800 01/19/20 1044   BP: 123/60 133/84   Pulse: 58 67   Patient Position - Orthostatic VS: Lying Sitting         Visual Acuity  Visual Acuity      Most Recent Value   L Pupil Size (mm)  5   R Pupil Size (mm)  5          ED Medications  Medications - No data to display    Diagnostic Studies  Results Reviewed     None                 CT head without contrast   Final Result by Roxy Donaldson DO (01/19 0148)   Cerebral atrophy with chronic small vessel ischemic white matter disease  No acute intracranial abnormality  Workstation performed: [id number]         CT spine cervical without contrast   Final Result by Roxy Donaldson DO (01/19 1044)   Moderate degenerative changes  No acute cervical spine fracture or traumatic malalignment  Workstation performed: [id number]         XR shoulder 2+ views RIGHT   Final Result by Judy Finley MD (01/19 1010)      No acute osseous abnormality  Degenerative changes as described  Workstation performed: [id number]                    Procedures  Procedures         ED Course                               MDM  Number of Diagnoses or Management Options  Acute pain of right shoulder: new and requires workup  Fall, initial encounter: new and requires workup  Diagnosis management comments: Generally well appearing the patient is a poor historian  All history obtained from EMS and from Veronica, a nurse at the patient's nursing facility  Patient on exam has some tenderness to palpation to the right side of her cervical spine  CT scan with no acute fractures or malalignment  She has an old bruise around her left orbit, no new bruising, reports mild headache  CT of the head with no acute intracranial abnormalities    Range of motion of the right shoulder is limited secondary to pain, this reportedly is the patient's baseline, that x-ray was obtained that shows no acute fractures or malalignment  Patient is able to ambulate and her cognitive function is at baseline per report  No other traumatic findings on exam   She is stable for discharge back to her nursing facility  Amount and/or Complexity of Data Reviewed  Tests in the radiology section of CPT®: ordered and reviewed  Obtain history from someone other than the patient: yes  Independent visualization of images, tracings, or specimens: yes    Patient Progress  Patient progress: stable        Disposition  Final diagnoses:   Fall, initial encounter   Acute pain of right shoulder     Time reflects when diagnosis was documented in both MDM as applicable and the Disposition within this note     Time User Action Codes Description Comment    1/19/2020 10:14 AM Loreto Tierney Add [S60  XXXA] Fall, initial encounter     1/19/2020 10:14 AM Loreto Tierney Add [F88 [id number]] Acute pain of right shoulder       ED Disposition     ED Disposition Condition Date/Time Comment    Discharge Stable Sun Jan 19, 2020 10:14 AM Xena Brooks discharge to home/self care  Follow-up Information     Follow up With Specialties Details Why Contact Info Additional Information    [facility] Emergency Medicine  Please return to the Emergency Department for sudden worsening of pain or new or concerning symptoms   [address]  [phone] [facility], [address], [place], [place], [address]          Discharge Medication List as of 1/19/2020 10:16 AM      CONTINUE these medications which have NOT CHANGED    Details   acetaminophen (TYLENOL) 650 mg CR tablet Take 650 mg by mouth every 6 (six) hours as needed for mild pain, Historical Med      donepezil (ARICEPT) 10 mg tablet Take 10 mg by mouth daily at bedtime, Historical Med      furosemide (LASIX) 40 mg tablet Take 40 mg by mouth 2 (two) times a day Takes 40mg PO QAM and 20mg after dinner, Historical Med      !! levothyroxine 50 mcg tablet Take 50 mcg by mouth every other day Tues-Thurs-Sat, Historical Med      !! levothyroxine 75 mcg tablet Take 75 mcg by mouth every other day Sun-Mon-Wed-Fri, Historical Med      memantine (NAMENDA) 10 mg tablet Take 10 mg by mouth daily, Historical Med      PARoxetine (PAXIL) 10 mg tablet Take 10 mg by mouth daily, Historical Med      QUEtiapine (SEROquel) 50 mg tablet Take 50 mg by mouth 2 (two) times a day, Historical Med      vitamin B-12 (VITAMIN B-12) 1,000 mcg tablet Take 2,000 mcg by mouth daily, Historical Med       !! - Potential duplicate medications found  Please discuss with provider  No discharge procedures on file      ED Provider  Electronically Signed by           Milli Padilla MD  01/19/20 5717

## 2020-01-22 NOTE — TELEPHONE ENCOUNTER
Behavorial Health Outpatient Intake Questions    Referred by: [facility]    Please advised interviewee that they need to answer all questions truthfully to allow for best care and any misrepresentations of information may affect their ability to be seen at this clinic   => Was this discussed? Yes     Behavorial Health Outpatient Intake History -     Presenting Problem (in patient's words): Dementia pt     Has the patient ever seen or is currently seeing a psychiatrist? No   If yes who/when? If seen as outpatient, have they been seen here (and by whom)? If not seen here, which provider(s) did the patient see and for how long? Has the patient ever seen or currently see a therapist? No If yes who/when? Has a member of the patient's family been in therapy here? No  If yes, with whom? Has the patient been hospitalized for mental health? No   If yes, how long ago was last hospitalization and where was it? Substance Abuse:No concerns of substance abuse are reported  Does the patient have ICM or CTT? No    Is the patient taking injectable psychiatric medications? No    => If yes, patient cannot be seen here  Communications  Are there any developmental disabilities? Yes    Does the patient have hearing impairment? No       History-    Has the patient served in the [place] [address]? No    If yes, have you had combat services? No    Was the patient activated into federal active duty as a member of the [employer] or reserve? No    Legal History-     Does the patient have any history of arrests, assisted/shelter time, or DUIs? No  If Yes-  1) What types of charges? 2) When were they last incarcerated? 3) Are they currently on parole or probation? Minor Child-    Who has custody of the child? Is there a custody agreement? If there is a custody agreement remind parent that they must bring a copy to the first appt or they will not be seen       Intake Team, please check with provider before scheduling if flags come up such as:  - complex case  - legal history (other than DUI)  - communication barrier concerns are present  - if, in your judgment, this needs further review    ACCEPTED as a patient Yes  => Appointment Date: 2/3/2020     Referred Elsewhere? No    Name of Insurance Co: [employer]  [facility] #  If ins is primary or secondary  If patient is a minor, parents information such as Name, D  O B of guarantor

## 2020-01-27 NOTE — ED PROVIDER NOTES
History  Chief Complaint   Patient presents with    Fall     unwit  fall this morning at nursing facility  multiple falls last week, old bruise noted to left forehead  78 yr femAle with dementia from long term care facility found on ground this am by staff--   No visible trauma- pt states that she remembers falling but can not state what happen-- has no comps at all  No injury -- no recent medical symptoms illness      History provided by:  Patient and nursing home  History limited by:  Dementia   used: No    Fall       Prior to Admission Medications   Prescriptions Last Dose Informant Patient Reported? Taking? PARoxetine (PAXIL) 10 mg tablet   Yes No   Sig: Take 10 mg by mouth daily   QUEtiapine (SEROquel) 50 mg tablet   Yes No   Sig: Take 50 mg by mouth 2 (two) times a day   acetaminophen (TYLENOL) 650 mg CR tablet   Yes No   Sig: Take 650 mg by mouth every 6 (six) hours as needed for mild pain   donepezil (ARICEPT) 10 mg tablet   Yes No   Sig: Take 10 mg by mouth daily at bedtime   furosemide (LASIX) 40 mg tablet   Yes No   Sig: Take 40 mg by mouth 2 (two) times a day Takes 40mg PO QAM and 20mg after dinner   levothyroxine 50 mcg tablet   Yes No   Sig: Take 50 mcg by mouth every other day Tues-Thurs-Sat   levothyroxine 75 mcg tablet   Yes No   Sig: Take 75 mcg by mouth every other day Sun-Mon-Wed-Fri   memantine (NAMENDA) 10 mg tablet   Yes No   Sig: Take 10 mg by mouth daily   vitamin B-12 (VITAMIN B-12) 1,000 mcg tablet   Yes No   Sig: Take 2,000 mcg by mouth daily      Facility-Administered Medications: None       Past Medical History:   Diagnosis Date    Dementia ([facility] [address] )        History reviewed  No pertinent surgical history  History reviewed  No pertinent family history  I have reviewed and agree with the history as documented      Social History     Tobacco Use    Smoking status: Never Smoker    Smokeless tobacco: Never Used   Substance Use Topics    Alcohol use: Never Frequency: Never    Drug use: Never        Review of Systems   Constitutional: Negative  HENT: Negative  Eyes: Negative  Respiratory: Negative  Cardiovascular: Negative  Gastrointestinal: Negative  Endocrine: Negative  Genitourinary: Negative  Musculoskeletal: Negative  Skin: Negative  Allergic/Immunologic: Negative  Neurological: Negative  Hematological: Negative  Psychiatric/Behavioral: Negative  Physical Exam  Physical Exam   Constitutional: She appears well-developed and well-nourished  No distress  avss-- pulse ox 98 % on ra- interpretation is normal no intervention- well appearing- in nad -    HENT:   Head: Normocephalic  Right Ear: External ear normal    Left Ear: External ear normal    Nose: Nose normal    Mouth/Throat: Oropharynx is clear and moist  No oropharyngeal exudate  Mild  Left frontal old appearing ecchymosis- from 1/19/20 fall as per ems- [facility] staff   Eyes: Pupils are equal, round, and reactive to light  Conjunctivae and EOM are normal  Right eye exhibits no discharge  Left eye exhibits no discharge  No scleral icterus  - mm pink   Neck: Normal range of motion  Neck supple  No JVD present  No tracheal deviation present  No thyromegaly present  No pmt c/t/l/s spine   Cardiovascular: Normal rate, regular rhythm, normal heart sounds and intact distal pulses  Exam reveals no gallop and no friction rub  No murmur heard  Pulmonary/Chest: Effort normal and breath sounds normal  No stridor  No respiratory distress  She has no wheezes  She has no rales  She exhibits no tenderness  Abdominal: Soft  Bowel sounds are normal  She exhibits no distension and no mass  There is no tenderness  There is no rebound and no guarding  No hernia  Soft nt/nd- no cva tenderness/ no peritoneal signs   Musculoskeletal: Normal range of motion  She exhibits edema  She exhibits no tenderness or deformity     1 plus ble pretibial edema- nt- no asym/ erythema/ eqyal bilateral radial/dp pulses   Lymphadenopathy:     She has no cervical adenopathy  Neurological: She is alert  No cranial nerve deficit or sensory deficit  She exhibits normal muscle tone  Coordination normal    A and o x 1 --    Skin: Skin is warm  Capillary refill takes less than 2 seconds  No rash noted  She is not diaphoretic  No erythema  No pallor  Psychiatric: She has a normal mood and affect  Her behavior is normal    Nursing note and vitals reviewed  Vital Signs  ED Triage Vitals [20 0711]   Temperature Pulse Respirations Blood Pressure SpO2   98 °F (36 7 °C) 64 16 167/99 98 %      Temp Source Heart Rate Source Patient Position - Orthostatic VS BP Location FiO2 (%)   Oral Monitor -- -- --      Pain Score       --           Vitals:    20 0711   BP: 167/99   Pulse: 64         Visual Acuity      ED Medications  Medications - No data to display    Diagnostic Studies  Results Reviewed     None                 No orders to display              Procedures  Procedures         ED Course  ED Course as of    0717 Er md note- 20 er chart-  iaming reports noted by er md       36 - er md note- pt tolerated ambulation   in er with no complaints- eating up er d/c with no comps-                                   MDM      Disposition  Final diagnoses:   None     ED Disposition     None      Follow-up Information    None         Patient's Medications   Discharge Prescriptions    No medications on file     No discharge procedures on file      ED Provider  Electronically Signed by           Maria E Fairchild MD  20

## 2020-01-29 NOTE — TELEPHONE ENCOUNTER
Has been in the dementia wing of [facility] for about six months  The staff want a psychiatric evaluation for Joanna Ulloa  Most of her short term memory is gone  She knows her son and most of the family  Avtar Huddleston had a number of questions about mental health in general as well    Decided to cancel appointment for psychotherapy, and instead wants his mother to be scheduled with a psychiatrist

## 2020-02-09 NOTE — ED NOTES
Called [facility] to arrange transport, they will call back with transport information     Bryan Navarro RN  02/08/20 2124

## 2020-02-09 NOTE — ED PROVIDER NOTES
History  Chief Complaint   Patient presents with    Fall     Pt presents to ED via EMS from [facility] where pt had unwitnessed fall  Pt (+) bruising to face, (-) thinners, (-) LOC  Pt denies any other injury  Pt (+) hx dementia  78 y o  Female without any complaint presents after an unwitnessed fall at the nursing home where she resides  Patient has some bruising to her forehead  Patient is not on any blood thinners  Patient has dementia and is at her basline  No nausea, vomiting  History provided by:  Patient   used: No    Fall   Mechanism of injury: fall    Injury location:  Head/neck  Incident location:  Nursing home  Arrived directly from scene: yes    Fall:     Fall occurred:  Unable to specify    Impact surface:  Unable to specify    Point of impact:  Unable to specify  Suspicion of alcohol use: no    Suspicion of drug use: no    Prior to arrival data:     Patient ambulatory at scene: no      Blood loss:  None    Responsiveness at scene:  Alert  Associated symptoms: no back pain, no chest pain, no headaches, no loss of consciousness, no nausea, no neck pain and no vomiting    Risk factors: no anticoagulation therapy        Prior to Admission Medications   Prescriptions Last Dose Informant Patient Reported? Taking?    PARoxetine (PAXIL) 10 mg tablet   Yes Yes   Sig: Take 30 mg by mouth daily    QUEtiapine (SEROquel) 50 mg tablet   Yes Yes   Sig: Take 50 mg by mouth 2 (two) times a day   acetaminophen (TYLENOL) 650 mg CR tablet   Yes Yes   Sig: Take 650 mg by mouth every 6 (six) hours as needed for mild pain   donepezil (ARICEPT) 10 mg tablet   Yes Yes   Sig: Take 10 mg by mouth daily at bedtime   furosemide (LASIX) 40 mg tablet   Yes Yes   Sig: Take 40 mg by mouth 2 (two) times a day Takes 40mg PO QAM and 20mg after dinner   levothyroxine 50 mcg tablet   Yes Yes   Sig: Take 50 mcg by mouth every other day Tues-Thurs-Sat   levothyroxine 75 mcg tablet   Yes Yes   Sig: Take 75 mcg by mouth every other day Sun-Mon-Wed-Fri   memantine (NAMENDA) 10 mg tablet   Yes Yes   Sig: Take 10 mg by mouth daily   vitamin B-12 (VITAMIN B-12) 1,000 mcg tablet   Yes Yes   Sig: Take 2,000 mcg by mouth daily      Facility-Administered Medications: None       Past Medical History:   Diagnosis Date    Alzheimer disease ([facility] )     Dementia ([place] [address] )     Depression, major, recurrent, mild ([place] [address] )     Disease of thyroid gland     Pain, unspecified     Psychosis ([place] [address] )        History reviewed  No pertinent surgical history  History reviewed  No pertinent family history  I have reviewed and agree with the history as documented  Social History     Tobacco Use    Smoking status: Never Smoker    Smokeless tobacco: Never Used   Substance Use Topics    Alcohol use: Never     Frequency: Never    Drug use: Never        Review of Systems   Cardiovascular: Negative for chest pain  Gastrointestinal: Negative for nausea and vomiting  Musculoskeletal: Negative for back pain and neck pain  Neurological: Negative for loss of consciousness and headaches  Hematological: Bruises/bleeds easily  All other systems reviewed and are negative  Physical Exam  Physical Exam   Constitutional: She is oriented to person, place, and time  She appears well-developed and well-nourished  No distress  HENT:   Head: Normocephalic  Mild bruising to left forehead   Eyes: Pupils are equal, round, and reactive to light  EOM are normal    Neck: Normal range of motion  No JVD present  Cardiovascular: Normal rate, regular rhythm, normal heart sounds and intact distal pulses  Exam reveals no gallop and no friction rub  No murmur heard  Pulmonary/Chest: Effort normal and breath sounds normal  No respiratory distress  She has no wheezes  She has no rales  She exhibits no tenderness  Musculoskeletal: Normal range of motion  She exhibits no tenderness     Neurological: She is alert and oriented to person, place, and time    Skin: Skin is warm and dry  Psychiatric: She has a normal mood and affect  Her behavior is normal  Judgment and thought content normal    Nursing note and vitals reviewed  Vital Signs  ED Triage Vitals   Temperature Pulse Respirations Blood Pressure SpO2   02/08/20 1753 02/08/20 1752 02/08/20 1752 02/08/20 1752 02/08/20 1752   97 8 °F (36 6 °C) (!) 53 16 147/65 94 %      Temp Source Heart Rate Source Patient Position - Orthostatic VS BP Location FiO2 (%)   02/08/20 1753 02/08/20 1752 02/08/20 1752 02/08/20 1752 --   Oral Monitor Sitting Right arm       Pain Score       02/08/20 1752       No Pain           Vitals:    02/08/20 1752 02/08/20 2019   BP: 147/65 98/51   Pulse: (!) 53 63   Patient Position - Orthostatic VS: Sitting Lying         Visual Acuity  Visual Acuity      Most Recent Value   L Pupil Size (mm)  5   R Pupil Size (mm)  5          ED Medications  Medications - No data to display    Diagnostic Studies  Results Reviewed     None                 CT head without contrast   Final Result by Dionicio Lopez DO (02/08 1953)   No acute intracranial abnormality                    Workstation performed: [id number]                    Procedures  Procedures         ED Course                               MDM  Number of Diagnoses or Management Options  Fall: new and requires workup  Forehead contusion: new and requires workup  Diagnosis management comments: Background: 78 y o  female presents for evaluation after an unwitnessed fall    Differential DX includes but is not limited to: facial contusion, less likely ich or skull fx    Plan: ct head, discharge back to facility          Amount and/or Complexity of Data Reviewed  Tests in the radiology section of CPT®: ordered and reviewed    Risk of Complications, Morbidity, and/or Mortality  Diagnostic procedures: high    Patient Progress  Patient progress: stable        Disposition  Final diagnoses:   Forehead contusion   Fall     Time reflects when diagnosis was documented in both MDM as applicable and the Disposition within this note     Time User Action Codes Description Comment    2/8/2020  8:22 PM Gretchen Gillis Add [S00 83XA] Forehead contusion     2/8/2020  8:22 PM Gretchen Gillis Add [B41  XXXA] Fall       ED Disposition     ED Disposition Condition Date/Time Comment    Discharge Stable Sat Feb 8, 2020  8:22 PM Jayy Azul discharge to home/self care  Follow-up Information     Follow up With Specialties Details Why Ruth Elliott MD Internal Medicine Schedule an appointment as soon as possible for a visit in 1 week As needed [address] [place] [address]  [phone]            Patient's Medications   Discharge Prescriptions    No medications on file     No discharge procedures on file      ED Provider  Electronically Signed by           Roro Woods MD  02/08/20 8302

## 2020-02-09 NOTE — ED NOTES
Kenyetta Fraire 130, spoke Tabitha, to inform them of pt status and transport back to facility       Tomer Graff RN  02/08/20 1601

## 2020-02-12 NOTE — ED NOTES
Patient ambulated around unit with steady gait using a walker  Negative distress noted       Nighat Nettle, RN  02/12/20 3475

## 2020-02-12 NOTE — ED PROVIDER NOTES
History  Chief Complaint   Patient presents with    Fall     Patient coming vis EMS from [facility] for an unwitnessed fall 30 minutes ago  No thinners - patient has no complaints  51-year-old female presents today after an unwitnessed fall just prior to arrival   Patient has no complaints  Was seen and evaluated at this facility 5 days ago for same  Still has left shoulder pain  Patient has dementia and is a poor historian  History provided by:  Medical records, EMS personnel and nursing home  History limited by:  Dementia  Fall   Mechanism of injury: fall    Injury location: Unknown  Incident location:  Nursing home  Fall:     Fall occurred:  Unable to specify    Impact surface:  Unable to specify    Point of impact:  Unable to specify  Suspicion of alcohol use: no    Suspicion of drug use: no    Associated symptoms: no blindness, no loss of consciousness and no seizures    Risk factors: no anticoagulation therapy and no past MI        Prior to Admission Medications   Prescriptions Last Dose Informant Patient Reported? Taking?    PARoxetine (PAXIL) 10 mg tablet   Yes Yes   Sig: Take 30 mg by mouth daily    QUEtiapine (SEROquel) 50 mg tablet   Yes Yes   Sig: Take 50 mg by mouth 2 (two) times a day   acetaminophen (TYLENOL) 650 mg CR tablet   Yes Yes   Sig: Take 650 mg by mouth every 6 (six) hours as needed for mild pain   capsicum (ZOSTRIX) 0 075 % topical cream Unknown at Unknown time  No No   Sig: Apply topically 3 (three) times a day   Patient not taking: Reported on 2/11/2020   donepezil (ARICEPT) 10 mg tablet   Yes Yes   Sig: Take 10 mg by mouth daily at bedtime   furosemide (LASIX) 40 mg tablet   Yes Yes   Sig: Take 40 mg by mouth 2 (two) times a day Takes 40mg PO QAM and 20mg after dinner   levothyroxine 50 mcg tablet   Yes Yes   Sig: Take 50 mcg by mouth every other day Tues-Thurs-Sat   levothyroxine 75 mcg tablet   Yes Yes   Sig: Take 75 mcg by mouth every other day Sun-Mon-Wed-Fri memantine (NAMENDA) 10 mg tablet   Yes Yes   Sig: Take 10 mg by mouth daily   vitamin B-12 (VITAMIN B-12) 1,000 mcg tablet   Yes Yes   Sig: Take 2,000 mcg by mouth daily      Facility-Administered Medications: None       Past Medical History:   Diagnosis Date    Alzheimer disease ([facility] )     Dementia ([facility] )     Depression, major, recurrent, mild ([facility] )     Disease of thyroid gland     Pain, unspecified     Psychosis ([facility] )        History reviewed  No pertinent surgical history  History reviewed  No pertinent family history  I have reviewed and agree with the history as documented  Social History     Tobacco Use    Smoking status: Never Smoker    Smokeless tobacco: Never Used   Substance Use Topics    Alcohol use: Never     Frequency: Never    Drug use: Never       Review of Systems   Unable to perform ROS: Dementia   Eyes: Negative for blindness  Neurological: Negative for seizures and loss of consciousness  Physical Exam  Physical Exam   Constitutional: She is oriented to person, place, and time  She appears well-developed and well-nourished  HENT:   Head:       Mouth/Throat: Uvula is midline, oropharynx is clear and moist and mucous membranes are normal  No tonsillar exudate  Several contusions on face which are deep purple in color, suggesting they are not new, however patient is unable to tell me when they occurred  Eyes: Pupils are equal, round, and reactive to light  Conjunctivae and EOM are normal    Neck: Normal range of motion  Neck supple  Cardiovascular: Normal rate and regular rhythm  Pulmonary/Chest: Effort normal and breath sounds normal    Abdominal: Soft  Bowel sounds are normal  There is no tenderness  There is no rebound and no guarding  Musculoskeletal: She exhibits no edema or deformity  Left shoulder: She exhibits decreased range of motion, tenderness, bony tenderness and pain  She exhibits no laceration     Neurological: She is alert and oriented to person, place, and time  Patient moving all extremities equally, no focal neuro deficits noted  Skin: Skin is warm and dry  Psychiatric: She has a normal mood and affect  Nursing note and vitals reviewed  Vital Signs  ED Triage Vitals [20]   Temperature Pulse Respirations Blood Pressure SpO2   98 °F (36 7 °C) 64 18 144/67 94 %      Temp Source Heart Rate Source Patient Position - Orthostatic VS BP Location FiO2 (%)   Oral Monitor -- -- --      Pain Score       No Pain           Vitals:    20   BP: 144/67   Pulse: 64         Visual Acuity      ED Medications  Medications - No data to display    Diagnostic Studies  Results Reviewed     None                 CT head without contrast   Final Result by Madison Membreno MD ([id number])      No acute intracranial abnormality  Workstation performed: [id number]         XR shoulder 2+ views LEFT   ED Interpretation by Brenton Fairchild DO ([id number])   No acute fracture dislocation  Hardware present      Final Result by Shaan Rolle MD ( [id number])      No acute osseous abnormality  Workstation performed: [id number]                    Procedures  Procedures         ED Course                               MDM  Number of Diagnoses or Management Options  Contusion of face, initial encounter: new and requires workup  Fall, initial encounter: new and requires workup  Left shoulder pain: new and requires workup  Diagnosis management comments: CT is negative for acute intracranial injury  X-ray of the left shoulder shows no fracture or dislocation  Will trial ambulate to ensure Patient is stable for discharge  Return to ED instructions reviewed  Follow-up with Orthopedics and PCP as an outpatient         Amount and/or Complexity of Data Reviewed  Tests in the radiology section of CPT®: ordered and reviewed  Obtain history from someone other than the patient: yes  Review and summarize past medical records: yes  Independent visualization of images, tracings, or specimens: yes    Risk of Complications, Morbidity, and/or Mortality  Presenting problems: high  Diagnostic procedures: high  Management options: moderate    Patient Progress  Patient progress: stable        Disposition  Final diagnoses:   Fall, initial encounter   Left shoulder pain   Contusion of face, initial encounter     Time reflects when diagnosis was documented in both MDM as applicable and the Disposition within this note     Time User Action Codes Description Comment    2/11/2020  9:54 PM Yao Leather Add [T27  Rodedmond Sextonro Fall, initial encounter     2/11/2020  9:54 PM Yao Leather Add [M25 512] Left shoulder pain     2/11/2020  9:54 PM Yao Leather Add [S00 83XA] Contusion of face, initial encounter       ED Disposition     ED Disposition Condition Date/Time Comment    Discharge Stable Tue Feb 11, 2020 10:53 PM Rocky Lemus [facility] discharge to home/self care              Follow-up Information     Follow up With Specialties Details Why Contact Info Additional Information    Víctor Blanton MD Internal Medicine Schedule an appointment as soon as possible for a visit   [address] [place] [address] Orthopedic Surgery Schedule an appointment as soon as possible for a visit   [place] [id number] [facility], [address] [place] [address], [phone]          Discharge Medication List as of 2/11/2020 10:54 PM      CONTINUE these medications which have NOT CHANGED    Details   acetaminophen (TYLENOL) 650 mg CR tablet Take 650 mg by mouth every 6 (six) hours as needed for mild pain, Historical Med      donepezil (ARICEPT) 10 mg tablet Take 10 mg by mouth daily at bedtime, Historical Med      furosemide (LASIX) 40 mg tablet Take 40 mg by mouth 2 (two) times a day Takes 40mg PO QAM and 20mg after dinner, Historical Med      !! levothyroxine 50 mcg tablet Take 50 mcg by mouth every other day Tues-Thurs-Sat, Historical Med      !! levothyroxine 75 mcg tablet Take 75 mcg by mouth every other day Sun-Mon-Wed-Fri, Historical Med      memantine (NAMENDA) 10 mg tablet Take 10 mg by mouth daily, Historical Med      PARoxetine (PAXIL) 10 mg tablet Take 30 mg by mouth daily , Historical Med      QUEtiapine (SEROquel) 50 mg tablet Take 50 mg by mouth 2 (two) times a day, Historical Med      vitamin B-12 (VITAMIN B-12) 1,000 mcg tablet Take 2,000 mcg by mouth daily, Historical Med      capsicum (ZOSTRIX) 0 075 % topical cream Apply topically 3 (three) times a day, Starting Sun 2/9/2020, Print       !! - Potential duplicate medications found  Please discuss with provider  No discharge procedures on file      PDMP Review     None          ED Provider  Electronically Signed by           Azalea Bull DO  02/13/20 9523

## 2020-02-12 NOTE — ED NOTES
Patient resting comfortably at this time, negative distress noted       Nicolas Powell RN  02/11/20 1883

## 2020-02-12 NOTE — ED PROVIDER NOTES
Pt Name: Celina Browne  MRN: [id number]  YOB: 1940  Age/Sex: 78 y o  female  Date of evaluation: 2/9/2020  PCP: Kane Howard MD    [address] [place] [address]    Chief Complaint   Patient presents with    Shoulder Pain     Pt presents to the ED with left shoulder pain  Pt fell yesterday, + head strike, CT was clean  Pt c/o left shoulder pain today  Provider at [facility] [place] would like her to have an xray to compare to previous xray done months ago  HPI    aMdeleine Good presents to the Emergency Department complaining of shoulder pain  Celina Browne is a 78 y o  female who presents due to Shoulder Pain  Pt sent by Kathie Porter [address] [place] [address] for second evaluation s/p fall yesterday with workup including CT imaging without acute findings, no intracranial bleed, stating that the patient complains of left shoulder pain--provider at facility requesting Xray left shoulder  No other reports--pt poor historian with hx dementia  Patient sitting in bed, no acute distress, requesting to go back to home  History provided by:  Patient and nursing home  History limited by:  Dementia   used: No          Past Medical and Surgical History    Past Medical History:   Diagnosis Date    Alzheimer disease ([facility]     Dementia ([facility] )     Depression, major, recurrent, mild ([facility] )     Disease of thyroid gland     Pain, unspecified     Psychosis ([facility] )        History reviewed  No pertinent surgical history  History reviewed  No pertinent family history  Social History     Tobacco Use    Smoking status: Never Smoker    Smokeless tobacco: Never Used   Substance Use Topics    Alcohol use: Never     Frequency: Never    Drug use: Never       Allergies    Allergies   Allergen Reactions    Iodine     Shellfish-Derived Products        Home Medications:    Prior to Admission medications    Medication Sig Start Date End Date Taking?  Authorizing Provider   acetaminophen (TYLENOL) 650 mg CR tablet Take 650 mg by mouth every 6 (six) hours as needed for mild pain    Historical Provider, MD   capsicum (ZOSTRIX) 0 075 % topical cream Apply topically 3 (three) times a day  Patient not taking: Reported on 2/11/2020 2/9/20   Quan Mena PA-C   donepezil (ARICEPT) 10 mg tablet Take 10 mg by mouth daily at bedtime    Historical Provider, MD   furosemide (LASIX) 40 mg tablet Take 40 mg by mouth 2 (two) times a day Takes 40mg PO QAM and 20mg after dinner    Historical Provider, MD   levothyroxine 50 mcg tablet Take 50 mcg by mouth every other day Tues-Thurs-Sat    Historical Provider, MD   levothyroxine 75 mcg tablet Take 75 mcg by mouth every other day Sun-Mon-Wed-Fri    Historical Provider, MD   memantine (NAMENDA) 10 mg tablet Take 10 mg by mouth daily    Historical Provider, MD   PARoxetine (PAXIL) 10 mg tablet Take 30 mg by mouth daily     Historical Provider, MD   QUEtiapine (SEROquel) 50 mg tablet Take 50 mg by mouth 2 (two) times a day    Historical Provider, MD   vitamin B-12 (VITAMIN B-12) 1,000 mcg tablet Take 2,000 mcg by mouth daily    Historical Provider, MD           Review of Systems    Review of Systems   Unable to perform ROS: Dementia     Physical Exam      ED Triage Vitals [02/09/20 2040]   Temperature Pulse Respirations Blood Pressure SpO2   98 8 °F (37 1 °C) 62 16 164/67 95 %      Temp Source Heart Rate Source Patient Position - Orthostatic VS BP Location FiO2 (%)   Oral Monitor -- Right arm --      Pain Score       --               Physical Exam   Constitutional: She appears well-developed and well-nourished  No distress  HENT:   Head: Normocephalic  Head is with contusion (forehead)  Eyes: Pupils are equal, round, and reactive to light  Conjunctivae and EOM are normal    Neck: Normal range of motion  Neck supple  Cardiovascular: Normal rate, regular rhythm, normal heart sounds and intact distal pulses  Exam reveals no gallop and no friction rub     Pulmonary/Chest: Effort normal and breath sounds normal  No stridor  No respiratory distress  She has no wheezes  She has no rales  She exhibits no tenderness  Musculoskeletal: Normal range of motion  Neurological: She is alert  She is disoriented (to place, time--baseline per records)  GCS eye subscore is 4  GCS verbal subscore is 4  GCS motor subscore is 6  Skin: Skin is warm  Capillary refill takes less than 2 seconds  She is not diaphoretic  Psychiatric: Her mood appears anxious  Her affect is labile  She is aggressive  Pt does not wish to be assessed, states that she wants to go home and does not know why they sent her here  Nursing note and vitals reviewed  Diagnostic Results      Labs:    No results found for this or any previous visit  All labs reviewed and utilized in the medical decision making process    Radiology:    XR shoulder 2+ views LEFT   ED Interpretation   No acute fracture/dislocation      Final Result      No acute osseous abnormality  Degenerative changes and sequela of prior rotator cuff repair, as described                 Workstation performed: [id number]             All radiology studies independently viewed by me and interpreted by the radiologist     Procedure    Procedures      Assessment and Plan    MDM  Number of Diagnoses or Management Options  Fall, initial encounter: new, needed workup  Left shoulder pain: new, needed workup     Amount and/or Complexity of Data Reviewed  Clinical lab tests: reviewed  Tests in the radiology section of CPT®: reviewed  Tests in the medicine section of CPT®: reviewed  Obtain history from someone other than the patient: yes  Review and summarize past medical records: yes  Independent visualization of images, tracings, or specimens: yes    Risk of Complications, Morbidity, and/or Mortality  Presenting problems: moderate  Diagnostic procedures: moderate  Management options: moderate    Patient Progress  Patient progress: stable      Initial ED assessment: Chaim Dennison is a 78 y o  female with significant PMH for dementia who brought to ED via EMS for Left shoulder pain  Vitals signs reviewed and wnl  Physical examination remarkable for contusion forehead, otherwise unremarkable  Initial Ddx  includes but is not limited to:   tendinitis, bursitis, arthritis, rotator cuff injury, fracture, dislocation, contusion, strain, sprain, brachial plexus impingement, radiculopathy; doubt septic arthritis or cardiac etiology or DVT or arterial occlusion  Initial ED plan:   Plan will be to perform diagnostic testing of XR and treat symptomatically   Final ED summary/disposition: Discussed results of diagnostic testing with pt in detail  Home care recommendations given with discharge paperwork  Return to ED instructions given if new/worsening sxs  MDM  Reviewed: previous chart, nursing note and vitals  Reviewed previous: CT scan, x-ray and labs  Interpretation: x-ray        ED Course of Care and Re-Assessments                           Medications - No data to display      FINAL IMPRESSION    Final diagnoses:   Left shoulder pain   Fall, initial encounter         DISPOSITION/PLAN  Time reflects when diagnosis was documented in both MDM as applicable and the Disposition within this note     Time User Action Codes Description Comment    2/9/2020  9:33 PM [facility] Add [M25 512] Left shoulder pain     2/9/2020  9:33 PM [facility] Add Iris Weldon, initial encounter       ED Disposition     ED Disposition Condition Date/Time Comment    Discharge Stable Sun Feb 9, 2020  9:33 PM Allison Ambrosio discharge to home/self care              Follow-up Information     Follow up With Specialties Details Why Contact Info Additional Information    Víctor Blanton MD Internal Medicine Call in 1 day For follow up [address] [place] [address] Specialists [place] [facility] Call  For follow up [address] [place]  [facility] [id number]  [address] Specialists [address], [facility] [place], [place], [facility]    [address] [facility] Emergency Medicine Go to  If symptoms worsen [address] [address]  [phone] AN ED, [address], [facility] [place], [place], [address]              PATIENT REFERRED TO:    Kavya Kim MD  [address] [facility]     Call in 1 day  For follow up    [address] [facility] [place]  [facility]  Call   For follow up    [address] [facility] [address] [phone]  [phone]  Go to   If symptoms worsen      DISCHARGE MEDICATIONS:    Discharge Medication List as of 2/9/2020  9:35 PM      START taking these medications    Details   capsicum (ZOSTRIX) 0 075 % topical cream Apply topically 3 (three) times a day, Starting Sun 2/9/2020, Print         CONTINUE these medications which have NOT CHANGED    Details   acetaminophen (TYLENOL) 650 mg CR tablet Take 650 mg by mouth every 6 (six) hours as needed for mild pain, Historical Med      donepezil (ARICEPT) 10 mg tablet Take 10 mg by mouth daily at bedtime, Historical Med      furosemide (LASIX) 40 mg tablet Take 40 mg by mouth 2 (two) times a day Takes 40mg PO QAM and 20mg after dinner, Historical Med      !! levothyroxine 50 mcg tablet Take 50 mcg by mouth every other day Tues-Thurs-Sat, Historical Med      !! levothyroxine 75 mcg tablet Take 75 mcg by mouth every other day Sun-Mon-Wed-Fri, Historical Med      memantine (NAMENDA) 10 mg tablet Take 10 mg by mouth daily, Historical Med      PARoxetine (PAXIL) 10 mg tablet Take 30 mg by mouth daily , Historical Med      QUEtiapine (SEROquel) 50 mg tablet Take 50 mg by mouth 2 (two) times a day, Historical Med vitamin B-12 (VITAMIN B-12) 1,000 mcg tablet Take 2,000 mcg by mouth daily, Historical Med       !! - Potential duplicate medications found  Please discuss with provider  No discharge procedures on file           KENDRA Echavarria PA-C  02/12/20 2043

## 2020-05-05 PROBLEM — Z20.822 SUSPECTED COVID-19 VIRUS INFECTION: Status: ACTIVE | Noted: 2020-01-01

## 2020-05-05 PROBLEM — J96.01 ACUTE RESPIRATORY FAILURE WITH HYPOXIA (HCC): Status: ACTIVE | Noted: 2020-01-01

## 2020-05-05 PROBLEM — E03.9 HYPOTHYROID: Status: ACTIVE | Noted: 2020-01-01

## 2020-05-05 PROBLEM — F03.90 DEMENTIA (HCC): Status: ACTIVE | Noted: 2020-01-01

## 2020-05-05 PROBLEM — A41.9 SEVERE SEPSIS (HCC): Status: ACTIVE | Noted: 2020-01-01

## 2020-05-05 PROBLEM — G93.40 ENCEPHALOPATHY: Status: ACTIVE | Noted: 2020-01-01

## 2020-05-05 PROBLEM — G92.8 TOXIC METABOLIC ENCEPHALOPATHY: Status: ACTIVE | Noted: 2020-01-01

## 2020-05-05 PROBLEM — R65.20 SEVERE SEPSIS (HCC): Status: ACTIVE | Noted: 2020-01-01

## 2020-05-05 PROBLEM — N17.9 AKI (ACUTE KIDNEY INJURY) (HCC): Status: ACTIVE | Noted: 2020-01-01

## 2020-05-05 PROBLEM — E87.6 HYPOKALEMIA: Status: ACTIVE | Noted: 2020-01-01

## 2020-05-07 PROBLEM — I82.409 DVT (DEEP VENOUS THROMBOSIS) (HCC): Status: ACTIVE | Noted: 2020-01-01

## 2020-05-08 PROBLEM — Z20.822 SUSPECTED COVID-19 VIRUS INFECTION: Status: RESOLVED | Noted: 2020-01-01 | Resolved: 2020-01-01

## 2020-05-10 PROBLEM — I48.91 ATRIAL FIBRILLATION (HCC): Status: ACTIVE | Noted: 2020-01-01

## 2020-07-07 NOTE — ED NOTES
Called nursing home to give an update and left a message for them to call me back        Mounika Tony RN  07/07/20 [id number]

## 2020-07-07 NOTE — ED NOTES
Called to cancel transport at this time     Sly Moore, [address] [place] [address]  07/07/20 5216

## 2020-07-07 NOTE — ED PROVIDER NOTES
History  Chief Complaint   Patient presents with    Altered Mental Status     Pt arrives via EMS from a nursing home for hypoxia and slurred speech  Per EMS patient was a little responsive when they arrived and then she was not  Pt had low BP  pt given 1L of fluids given via IO  Pt had bloody stools noted today  Pt currently on non-rebreather       History provided by:  EMS personnel  History limited by:  Acuity of condition  Altered Mental Status   Presenting symptoms: confusion, lethargy and unresponsiveness    Severity:  Severe  Most recent episode: Today  Episode history:  Continuous  Duration:  1 day  Timing:  Constant  Progression:  Unchanged  Chronicity:  New  Context: dementia    Context comment:  From nursing home, severe dementia, found to black and red stool in her diaper, became unresponsive, hypotensive and hypoxic for paramedics  Brought here in extremis  Associated symptoms: agitation        Prior to Admission Medications   Prescriptions Last Dose Informant Patient Reported? Taking?    PARoxetine (PAXIL) 10 mg tablet  Outside Facility (Specify) Yes No   Sig: Take 30 mg by mouth daily    QUEtiapine (SEROquel) 50 mg tablet  Outside Facility (Specify) Yes No   Sig: Take 50 mg by mouth 2 (two) times a day   acetaminophen (TYLENOL) 650 mg CR tablet  Outside Facility (Specify) Yes No   Sig: Take 650 mg by mouth every 6 (six) hours as needed for mild pain   apixaban (ELIQUIS) 5 mg   No No   Sig: Take 2 tablets (10 mg total) by mouth once for 1 dose   apixaban (ELIQUIS) 5 mg  Outside Facility (Specify) No No   Sig: Take 1 tablet (5 mg total) by mouth 2 (two) times a day   cholecalciferol (VITAMIN D3) 1,000 units tablet  Outside Facility (Specify) No No   Sig: Take 2 tablets (2,000 Units total) by mouth daily   donepezil (ARICEPT) 10 mg tablet  Outside Facility (Specify) Yes No   Sig: Take 10 mg by mouth daily at bedtime   levothyroxine 50 mcg tablet  Outside Facility (Specify) Yes No   Sig: Take 50 mcg by mouth every other day Tues-Thurs-Sat   levothyroxine 75 mcg tablet  Outside Facility (Specify) Yes No   Sig: Take 75 mcg by mouth every other day Sun-Mon-Wed-Fri   memantine (NAMENDA) 10 mg tablet  Outside Facility (Specify) Yes No   Sig: Take 10 mg by mouth daily   metoprolol tartrate (LOPRESSOR) 25 mg tablet  Outside Facility (Specify) No No   Sig: Take 0 5 tablets (12 5 mg total) by mouth every 12 (twelve) hours   vitamin B-12 (VITAMIN B-12) 1,000 mcg tablet  Outside Facility (Specify) Yes No   Sig: Take 2,000 mcg by mouth daily      Facility-Administered Medications: None       Past Medical History:   Diagnosis Date    [facility]     Dementia ([facility] )     Depression, major, recurrent, mild ([facility] )     Disease of thyroid gland     Pain, unspecified     Psychosis ([facility] )        No past surgical history on file  No family history on file  I have reviewed and agree with the history as documented  E-Cigarette/Vaping     E-Cigarette/Vaping Substances     Social History     Tobacco Use    Smoking status: Never Smoker    Smokeless tobacco: Never Used   Substance Use Topics    Alcohol use: Never     Frequency: Never    Drug use: Never       Review of Systems   Unable to perform ROS: Mental status change   Psychiatric/Behavioral: Positive for agitation and confusion  Physical Exam  Physical Exam   Constitutional: She appears toxic  She appears ill  She appears distressed  Frail and elderly   HENT:   Head: Normocephalic and atraumatic  Eyes: Pupils are equal, round, and reactive to light  Neck: Normal range of motion  Neck supple  No tracheal deviation present  Cardiovascular: Regular rhythm, normal heart sounds and intact distal pulses  No murmur heard  Tachycardic, sinus in the 140s   Pulmonary/Chest: Breath sounds normal  No stridor  She is in respiratory distress  Tachypneic in the 30s   Abdominal: Soft  She exhibits no distension  There is no tenderness   There is no rebound and no guarding  Abdomen nontender to diffuse palpation, rectal exam:  Diaper filled with a large amount of black and red stool, guaiac positive   Musculoskeletal: Normal range of motion  Neurological: She is unresponsive  GCS eye subscore is 1  GCS verbal subscore is 2  GCS motor subscore is 5  Skin: Skin is warm and dry  She is not diaphoretic  No erythema  No pallor  Psychiatric: She has a normal mood and affect  Vitals reviewed        Vital Signs  ED Triage Vitals   Temperature Pulse Respirations Blood Pressure SpO2   07/07/20 1604 07/07/20 1539 07/07/20 1539 07/07/20 1539 07/07/20 1539   98 7 °F (37 1 °C) (!) 135 (!) 24 123/89 90 %      Temp Source Heart Rate Source Patient Position - Orthostatic VS BP Location FiO2 (%)   07/07/20 1604 07/07/20 1539 07/07/20 1539 07/07/20 1539 --   Axillary Monitor Lying Right arm       Pain Score       --                  Vitals:    07/07/20 1730 07/07/20 1745 07/07/20 1754 07/07/20 1819   BP: (!) 67/35      Pulse: (!) 134 (!) 126 65 (!) 0   Patient Position - Orthostatic VS:             Visual Acuity  Visual Acuity      Most Recent Value   L Pupil Size (mm)  4   R Pupil Size (mm)  4          ED Medications  Medications   morphine (PF) 10 mg/mL injection 6 mg (6 mg Intravenous Given 7/7/20 1554)   lidocaine (cardiac) (FOR EMS ONLY) 20 mg/mL injection 100 mg (0 mg Does not apply Given to EMS 7/7/20 1639)   naloxone (FOR EMS ONLY) [facility]) 2 MG/2ML injection 2 mg (0 mg Does not apply Given to EMS 7/7/20 1639)   morphine (PF) 4 mg/mL injection 4 mg (4 mg Intravenous Given 7/7/20 1759)       Diagnostic Studies  Results Reviewed     Procedure Component Value Units Date/Time    Basic metabolic panel [[id number]]  (Abnormal) Collected:  07/07/20 1544    Lab Status:  Final result Specimen:  Blood from Arm, Left Updated:  07/07/20 1720     Sodium 162 mmol/L      Potassium 2 5 mmol/L      Chloride 115 mmol/L      CO2 16 mmol/L      ANION GAP 31 mmol/L      BUN 34 mg/dL Creatinine 2 74 mg/dL      Glucose 174 mg/dL      Calcium 8 6 mg/dL      eGFR 16 ml/min/1 73sq m     Narrative:       National Kidney Disease Foundation guidelines for Chronic Kidney Disease (CKD):     Stage 1 with normal or high GFR (GFR > 90 mL/min/1 73 square meters)    Stage 2 Mild CKD (GFR = 60-89 mL/min/1 73 square meters)    Stage 3A Moderate CKD (GFR = 45-59 mL/min/1 73 square meters)    Stage 3B Moderate CKD (GFR = 30-44 mL/min/1 73 square meters)    Stage 4 Severe CKD (GFR = 15-29 mL/min/1 73 square meters)    Stage 5 End Stage CKD (GFR <15 mL/min/1 73 square meters)  Note: GFR calculation is accurate only with a steady state creatinine    CBC and differential [[id number]]  (Abnormal) Collected:  07/07/20 1544    Lab Status:  Final result Specimen:  Blood from Arm, Left Updated:  07/07/20 1715     WBC 23 83 Thousand/uL      RBC 4 45 Million/uL      Hemoglobin 14 2 g/dL      Hematocrit 43 6 %      MCV 98 fL      MCH 31 9 pg      MCHC 32 6 g/dL      RDW 17 7 %      MPV 11 2 fL      Platelets 103 Thousands/uL      nRBC 0 /100 WBCs      Neutrophils Relative 91 %      Immat GRANS % 1 %      Lymphocytes Relative 5 %      Monocytes Relative 3 %      Eosinophils Relative 0 %      Basophils Relative 0 %      Neutrophils Absolute 21 54 Thousands/µL      Immature Grans Absolute 0 22 Thousand/uL      Lymphocytes Absolute 1 22 Thousands/µL      Monocytes Absolute 0 80 Thousand/µL      Eosinophils Absolute 0 01 Thousand/µL      Basophils Absolute 0 04 Thousands/µL     Narrative: This is an appended report  These results have been appended to a previously verified report                   No orders to display              Procedures  ECG 12 Lead Documentation Only  Date/Time: 7/7/2020 4:16 PM  Performed by: Sherly Michelle DO  Authorized by: Sherly Michelle DO     ECG reviewed by me, the ED Provider: yes    Patient location:  ED  Previous ECG:     Previous ECG:  Compared to current  Interpretation: Interpretation: abnormal    Rate:     ECG rate:  137    ECG rate assessment: tachycardic    Rhythm:     Rhythm: sinus rhythm    Ectopy:     Ectopy: none    QRS:     QRS axis:  Normal    QRS intervals:  Normal  Conduction:     Conduction: normal    ST segments:     ST segments:  Non-specific    ST segment depression noted on lead: Diffuse ST depression consistent with global ischemia  T waves:     T waves: non-specific      CriticalCare Time  Performed by: Raúl Guo DO  Authorized by: Raúl Guo DO     Critical care provider statement:     Critical care time (minutes):  70    Critical care time was exclusive of:  Separately billable procedures and treating other patients    Critical care was necessary to treat or prevent imminent or life-threatening deterioration of the following conditions:  Circulatory failure, cardiac failure, renal failure and respiratory failure    Critical care was time spent personally by me on the following activities:  Obtaining history from patient or surrogate, development of treatment plan with patient or surrogate, evaluation of patient's response to treatment, examination of patient, ordering and performing treatments and interventions, ordering and review of radiographic studies, re-evaluation of patient's condition, review of old charts and discussions with consultants             ED Course  ED Course as of Jul 07 1822 Tue Jul 07, 2020   2173 Conversation with patient's son Hugh Daly at [phone]  We had a long conversation about his mother, he states that she has severe dementia, is bed-bound, has very little enjoyment in life, he is the power of , they have had conversations about t end of lif care and decision was made not to perform any aggressive measures including blood transfusions, intubation, resuscitation, etc , he said "not to sound crewel but rather her be comfortable and let nature take its course"    We had a discussion about hospice and he is requesting his mother be made full comfort measures  Understanding this will result in her death, he is driving here from [facility] he will be here in about an hour       1614 Personally removed patient's  tibial IO      1619 Discussed case with hospice, they will be in to evaluate the patient      1635 Repeat examination unchanged  18 Son at bedside, we had a very long conversation, requesting patient be discharged back to her facility as she is comfortable there, they will arrange for hospice there instead of patient being transferred to hospice house  1756 Patient's heart rate decreased to the 60s, experiencing some air hunger, moaning in some discomfort will re-dose morphine      1818 Patient became bradycardic, slowly more severe bradycardic, for became apneic, PA rest now asystole  Time of death called at 6:18 p m  MDM  Number of Diagnoses or Management Options  Acute hypernatremia: new and requires workup  Acute hypokalemia: new and requires workup  Acute renal failure (ARF) ([facility] ): new and requires workup  Altered mental status: new and requires workup  Encounter for hospice care: new and requires workup  Gastrointestinal hemorrhage, unspecified gastrointestinal hemorrhage type: new and requires workup  High anion gap metabolic acidosis: new and requires workup  Hypotension, unspecified hypotension type: new and requires workup  Hypoxia: new and requires workup  Nontraumatic hemorrhagic shock ([facility] ): new and requires workup  Diagnosis management comments:       Initial ED assessment and plan:  [de-identified] female brought in in extremis, severely ill, toxic in appearance, hypotensive, hypoxic, tachycardic, tachypneic, with a large GI bleed  Likely from mesenteric ischemia/bowel ischemia    Patient has baseline dementia , had a long conversation with the patient's son, decision was made to make her DNR DNI and full comfort measures understanding this likely will result in the death of the patient  , he understands this  He will be in to see the patient  Contacted hospice who will also be in to arrange for inpatient hospice  Will keep patient on full comfort measures, will re-dose pain medication as needed  Final ED summary/disposition:   After evaluation and workup in the emergency department, patient ultimately  in emergency department, comfort measures were decided upon  Ultimately patient  in emergency department  I personally called  time of death called on 2020 at 6:18 p m  Priscilla Boogie Patient's son was at bedside during patient's death  He was updated multiple times throughout patient's ED stay  He was very thankful for the care he received    He chose not to wait for corner       Amount and/or Complexity of Data Reviewed  Clinical lab tests: ordered and reviewed  Decide to obtain previous medical records or to obtain history from someone other than the patient: yes  Obtain history from someone other than the patient: yes  Review and summarize past medical records: yes  Discuss the patient with other providers: yes          Disposition  Final diagnoses:   Hypotension, unspecified hypotension type   Gastrointestinal hemorrhage, unspecified gastrointestinal hemorrhage type   Nontraumatic hemorrhagic shock ([address] )   Hypoxia   Altered mental status - GCS 8   Acute hypernatremia   Acute hypokalemia   Acute renal failure (ARF) (HCC)   High anion gap metabolic acidosis   Encounter for hospice care   Acute generalized abdominal pain   Cardiopulmonary arrest ([address] )   Mesenteric ischemia ([address] )     Time reflects when diagnosis was documented in both MDM as applicable and the Disposition within this note     Time User Action Codes Description Comment    2020  4:40 PM Do FORTUNE Add [I95 9] Hypotension, unspecified hypotension type     2020  4:40 PM Robert Roman Add [K92 2] Gastrointestinal hemorrhage, unspecified gastrointestinal hemorrhage type     2020  4:40 PM Algis Goodell J Add [R57 8] Nontraumatic hemorrhagic shock ([facility] )     2020  4:40 PM Michael Sequin Modify [I95 9] Hypotension, unspecified hypotension type     2020  4:40 PM Algis Goodell J Modify [R57 8] Nontraumatic hemorrhagic shock ([facility] )     2020  4:40 PM Michael Sequin Add [R09 02] Hypoxia     2020  4:41 PM Michael Sequin Add [R41 82] Altered mental status     2020  4:41 PM Michael Sequin Modify [R41 82] Altered mental status GCS a    2020  4:41 PM Michael Sequin Modify [R41 82] Altered mental status GCS 8    2020  5:24 PM Parth Klein Add [E87 0] Acute hypernatremia     2020  5:25 PM Jethro Klein Add [E87 6] Acute hypokalemia     2020  5:25 PM Michael Sequin Add [N17 9] Acute renal failure (ARF) ([facility] )     2020  5:25 PM Michael Sequin Add [E87 2] High anion gap metabolic acidosis     8528  5:49 PM Michael Sequin Add [Z51 5] Encounter for hospice care     2020  5:50 PM Michael Sequin Add [R10 84] Acute generalized abdominal pain     2020  6:21 PM Algis Goodell J Add [I46 9] Cardiopulmonary arrest ([facility] )     2020  6:21 PM Michael Sequin Add [K55 9] Mesenteric ischemia [facility])       ED Disposition     ED Disposition Condition Date/Time Comment        6:20 PM Time of death 2020 at 6:18 p m         MD Documentation      Most [facility] Name, [address] [place] [address] Drive of [facility] Name, [address] [place] [address]   Report Given to  [place]      Follow-up Information    None         Patient's Medications   Discharge Prescriptions    LORAZEPAM (ATIVAN) 2 MG/ML CONCENTRATED SOLUTION    Take 0 5 mL (1 mg total) by mouth every 6 (six) hours as needed for anxiety for up to 10 days       Start Date: 2020  End Date: 2020 Order Dose: 1 mg       Quantity: 30 mL    Refills: 0    MORPHINE 20 MG/ML CONCENTRATED SOLUTION    Take 0 5 mL (10 mg total) by mouth every hour as needed for severe pain for up to 10 daysMax Daily Amount: 240 mg       Start Date: 7/7/2020  End Date: 7/17/2020       Order Dose: 10 mg       Quantity: 30 mL    Refills: 0     No discharge procedures on file      PDMP Review       Value Time User    PDMP Reviewed  Yes 5/11/2020  2:30 PM Alisha Boyd MD          ED Provider  Electronically Signed by           Aman Mascorro DO  07/07/20 Sharon More

## 2020-07-07 NOTE — ED NOTES
Attempted to call gift of life x3 with no answer  Will have primary RN Ena Gold try again later       Ned Angeles, TC  07/07/20 4188

## 2020-07-07 NOTE — ED NOTES
Per Shane Catalan, [facility]  [id number] [address], [address] [place] [address]  07/07/20 3796

## 2020-07-07 NOTE — ED NOTES
Dr Schuyler Ziegler called time of death at this time  Family at bedside        Sapna Best RN  07/07/20 5420

## 2020-07-08 ENCOUNTER — PATIENT OUTREACH (OUTPATIENT)
Dept: CASE MANAGEMENT | Facility: OTHER | Age: 80
End: 2020-07-08

## 2020-07-08 LAB
ATRIAL RATE: 137 BPM
P AXIS: 80 DEGREES
PR INTERVAL: 174 MS
QRS AXIS: 73 DEGREES
QRSD INTERVAL: 64 MS
QT INTERVAL: 272 MS
QTC INTERVAL: 410 MS
T WAVE AXIS: -84 DEGREES
VENTRICULAR RATE: 137 BPM

## 2020-07-08 PROCEDURE — 93010 ELECTROCARDIOGRAM REPORT: CPT | Performed by: INTERNAL MEDICINE

## 2020-07-08 NOTE — PROGRESS NOTES
ADT notifications received for patient being treated at [facility] Emergency Department for Nontraumatic hemorrhagic shock  Patient  at [phone]  BPCI form updated, care coordination note removed and bundle episode resolved  CM removed self from care team and sent Inbasket message to Transitional Care Specialist regarding bundle closure